# Patient Record
Sex: MALE | Race: WHITE | NOT HISPANIC OR LATINO | Employment: OTHER | URBAN - METROPOLITAN AREA
[De-identification: names, ages, dates, MRNs, and addresses within clinical notes are randomized per-mention and may not be internally consistent; named-entity substitution may affect disease eponyms.]

---

## 2018-04-12 ENCOUNTER — TELEPHONE (OUTPATIENT)
Dept: CARDIOLOGY | Facility: MEDICAL CENTER | Age: 63
End: 2018-04-12

## 2018-04-12 NOTE — TELEPHONE ENCOUNTER
04/13/18 @ 1400 I called the patient back at [phone] and left voicemail re: records if needed for the visit. SAH  -------------------------------------------------------------------------------------------------------------------------------------------------------------------------------------------------------------------------------------------------------------  I called the patient at [phone] or [phone] & left a voicemail regarding:  -To confirm appt with SW for 04/19/18 @ 1400.  -To see if I needed to obtain additional records for the visit.   SAH

## 2018-04-19 ENCOUNTER — OFFICE VISIT (OUTPATIENT)
Dept: CARDIOLOGY | Facility: MEDICAL CENTER | Age: 63
End: 2018-04-19
Payer: COMMERCIAL

## 2018-04-19 VITALS
HEIGHT: 69 IN | BODY MASS INDEX: 25.92 KG/M2 | OXYGEN SATURATION: 97 % | WEIGHT: 175 LBS | HEART RATE: 50 BPM | DIASTOLIC BLOOD PRESSURE: 90 MMHG | SYSTOLIC BLOOD PRESSURE: 190 MMHG

## 2018-04-19 DIAGNOSIS — R07.89 CHEST DISCOMFORT: ICD-10-CM

## 2018-04-19 DIAGNOSIS — I10 ESSENTIAL HYPERTENSION, BENIGN: ICD-10-CM

## 2018-04-19 PROBLEM — H33.20 RETINAL DETACHMENT: Status: ACTIVE | Noted: 2018-04-19

## 2018-04-19 PROBLEM — H26.9 CATARACT: Status: ACTIVE | Noted: 2018-04-19

## 2018-04-19 LAB — EKG IMPRESSION: NORMAL

## 2018-04-19 PROCEDURE — 99214 OFFICE O/P EST MOD 30 MIN: CPT | Performed by: INTERNAL MEDICINE

## 2018-04-19 PROCEDURE — 93000 ELECTROCARDIOGRAM COMPLETE: CPT | Performed by: INTERNAL MEDICINE

## 2018-04-19 RX ORDER — DILTIAZEM HCL 90 MG
90 TABLET ORAL 2 TIMES DAILY
COMMUNITY
End: 2018-04-19

## 2018-04-19 RX ORDER — SILDENAFIL CITRATE 20 MG/1
20 TABLET ORAL PRN
COMMUNITY
End: 2021-11-24

## 2018-04-19 RX ORDER — HYDROCHLOROTHIAZIDE 12.5 MG/1
12.5 TABLET ORAL DAILY
COMMUNITY
End: 2021-11-24

## 2018-04-19 RX ORDER — ALPRAZOLAM 0.5 MG/1
0.5 TABLET ORAL NIGHTLY PRN
COMMUNITY
End: 2021-11-24

## 2018-04-19 RX ORDER — LISINOPRIL 5 MG/1
5 TABLET ORAL DAILY
Qty: 60 TAB | Refills: 11 | Status: SHIPPED | OUTPATIENT
Start: 2018-04-19 | End: 2021-11-24

## 2018-04-19 RX ORDER — AMLODIPINE BESYLATE 2.5 MG/1
2.5 TABLET ORAL DAILY
Qty: 30 TAB | Refills: 11 | Status: SHIPPED | OUTPATIENT
Start: 2018-04-19 | End: 2021-11-24

## 2018-04-19 ASSESSMENT — ENCOUNTER SYMPTOMS
FEVER: 0
PND: 0
LOSS OF CONSCIOUSNESS: 0
DIZZINESS: 0
ORTHOPNEA: 0
BLURRED VISION: 1
INSOMNIA: 0
CHILLS: 0
PALPITATIONS: 0
ABDOMINAL PAIN: 0
MYALGIAS: 0
DEPRESSION: 1
SHORTNESS OF BREATH: 0

## 2018-04-19 NOTE — PROGRESS NOTES
"Chief Complaint   Patient presents with   • Hypertension    • Chest discomfort        Subjective:   Rodrigo Nunez is a 63 y.o. male who presents today referred by his PCP Jorge Rosado D.O., [facility] for a cardiology consultation for evaluation and management of hypertension and chest discomfort.    The patient has a history of hypertension initially diagnosed at the time of the emergency room visit in December, 2015, the patient thinks at [facility].  Blood pressure was 205 systolic.  He also had substernal chest discomfort.  The patient was Overnight and had a stress test that was unremarkable.  He was started on a beta blocker but then \"passed out\" a month later and was started on diltiazem 90 mg twice daily that he is continued.    He had a recent similar episode 3 weeks ago in which he woke up in the middle of night with some chest discomfort and his blood pressures 168/85.  He took a Xanax with some improvement.  Was seen by Dr. Rosado and HCTZ was added.    The patient states that he is under a lot of stress and has some anger issues.   He wonders if his blood pressure can cause these feelings.    Had laser surgery for retinal tear right eye 10/2017.  Right eye vision not improved.  Seen by ophthalmologist today and was diagnosed with a cataract which is recommended to be removed.    Denies a history of heart disease.  History of 40-pack-year smoking quit 10 years ago.  Denies diabetes mellitus or hyperlipidemia.    Social history  .  Works at [employer] 12 hours a day.    Family history negative for heart disease.    Medical history  Right retinal laser surgery 10/2017.  Cataract    History reviewed. No pertinent past medical history.  History reviewed. No pertinent surgical history.  History reviewed. No pertinent family history.  Social History     Social History   • Marital status:      Spouse name: N/A   • Number of children: N/A   • Years of education: " "N/A     Occupational History   • Not on file.     Social History Main Topics   • Smoking status: Former Smoker   • Smokeless tobacco: Never Used   • Alcohol use Not on file   • Drug use: Unknown   • Sexual activity: Not on file     Other Topics Concern   • Not on file     Social History Narrative   • No narrative on file     No Known Allergies  Outpatient Encounter Prescriptions as of 4/19/2018   Medication Sig Dispense Refill   • DILTIAZem (CARDIZEM) 90 MG Tab Take 90 mg by mouth 2 Times a Day.     • hydroCHLOROthiazide (HYDRODIURIL) 12.5 MG tablet Take 12.5 mg by mouth every day.     • Non Formulary Request MATTHEW 40     • [place] Berry 550 MG Cap Take  by mouth.     • MILK THISTLE PO Take 1,260 mg by mouth.     • aspirin 81 MG tablet Take 81 mg by mouth every day.     • ALPRAZolam (XANAX) 0.5 MG Tab Take 0.5 mg by mouth at bedtime as needed for Sleep.     • sildenafil (REVATIO) 20 MG tablet Take 20 mg by mouth as needed.     • promethazine-codeine (PHENERGAN-CODEINE) 6.25-10 MG/5ML Syrup Take 5-10 mL by mouth 3 times a day as needed for Cough (or use qhs). (Patient not taking: Reported on 4/19/2018) 150 mL 0     No facility-administered encounter medications on file as of 4/19/2018.      Review of Systems   Constitutional: Negative for chills and fever.   HENT: Negative for congestion.    Eyes: Positive for blurred vision.   Respiratory: Negative for shortness of breath.    Cardiovascular: Positive for chest pain. Negative for palpitations, orthopnea, leg swelling and PND.   Gastrointestinal: Negative for abdominal pain.   Genitourinary: Negative for dysuria.   Musculoskeletal: Negative for joint pain and myalgias.   Skin: Negative for rash.   Neurological: Negative for dizziness and loss of consciousness.   Psychiatric/Behavioral: Positive for depression. The patient does not have insomnia.         Objective:   BP (!) 190/90   Pulse (!) 50   Ht 1.753 m (5' 9\")   Wt 79.4 kg (175 lb)   SpO2 97%   BMI 25.84 " kg/m²     Physical Exam   Constitutional: He is oriented to person, place, and time. He appears well-developed and well-nourished.   HENT:   Head: Normocephalic and atraumatic.   Eyes: EOM are normal. Pupils are equal, round, and reactive to light. No scleral icterus.   Neck: Neck supple. No JVD present. No thyromegaly present.   Cardiovascular: Normal rate, regular rhythm, normal heart sounds and intact distal pulses.  Exam reveals no gallop and no friction rub.    No murmur heard.  Pulmonary/Chest: Effort normal and breath sounds normal. No respiratory distress. He has no wheezes. He has no rales.   Abdominal: Soft. Bowel sounds are normal. He exhibits no mass. There is no tenderness.   Protuberant.   Musculoskeletal: Normal range of motion. He exhibits no edema or deformity.   Lymphadenopathy:     He has no cervical adenopathy.   Neurological: He is alert and oriented to person, place, and time. No cranial nerve deficit. He exhibits normal muscle tone.   Skin: Skin is warm and dry.   Psychiatric: He has a normal mood and affect. His behavior is normal.     04/19/2018 EKG: Sinus bradycardia, rate 45. Reviewed by myself.    Assessment:     1. Essential hypertension, benign  EKG   2. Chest discomfort         Medical Decision Making:  Today's Assessment / Status / Plan:     1. Hypertension. Uncontrolled.    2. Sinus bradycardia related to diltiazem.    3. Adult situational stress syndrome with anger issues.    Recommendation Discussion  I reviewed with the patient is current situation pertaining to his hypertension.  Start lisinopril 5 mg twice daily.  Instructed to keep daily blood pressure log.  If blood pressure remains elevated start amlodipine 2.5 mg daily.  Follow-up 10 days to weeks.  Will obtain recent blood work apparently done at [facility].  I recommended that he follow-up with Dr. Tate for consideration of professional intervention for his intense anger issue.  Thank you for limited to see this  gentleman in consultation

## 2018-04-19 NOTE — LETTER
"     [facility]   [address] [place] [address]  [place] Armas [address]  Phone: [phone]  Fax: [phone]              Rodrigo Nunez  1955    Encounter Date: 4/19/2018    Jorge Casanova M.D.          PROGRESS NOTE:  Chief Complaint   Patient presents with   • Hypertension    • Chest discomfort        Subjective:   Rodrigo Nunez is a 63 y.o. male who presents today referred by his PCP Jorge Rosado D.O., [facility] for a cardiology consultation for evaluation and management of hypertension and chest discomfort.    The patient has a history of hypertension initially diagnosed at the time of the emergency room visit in December, 2015, the patient thinks at [facility].  Blood pressure was 205 systolic.  He also had substernal chest discomfort.  The patient was Overnight and had a stress test that was unremarkable.  He was started on a beta blocker but then \"passed out\" a month later and was started on diltiazem 90 mg twice daily that he is continued.    He had a recent similar episode 3 weeks ago in which he woke up in the middle of night with some chest discomfort and his blood pressures 168/85.  He took a Xanax with some improvement.  Was seen by Dr. Rosado and HCTZ was added.    The patient states that he is under a lot of stress and has some anger issues.   He wonders if his blood pressure can cause these feelings.    Had laser surgery for retinal tear right eye 10/2017.  Right eye vision not improved.  Seen by ophthalmologist today and was diagnosed with a cataract which is recommended to be removed.    Denies a history of heart disease.  History of 40-pack-year smoking quit 10 years ago.  Denies diabetes mellitus or hyperlipidemia.    Social history  .  Works at [employer] 12 hours a day.    Family history negative for heart disease.    Medical history  Right retinal laser surgery 10/2017.  Cataract    History reviewed. " No pertinent past medical history.  History reviewed. No pertinent surgical history.  History reviewed. No pertinent family history.  Social History     Social History   • Marital status:      Spouse name: N/A   • Number of children: N/A   • Years of education: N/A     Occupational History   • Not on file.     Social History Main Topics   • Smoking status: Former Smoker   • Smokeless tobacco: Never Used   • Alcohol use Not on file   • Drug use: Unknown   • Sexual activity: Not on file     Other Topics Concern   • Not on file     Social History Narrative   • No narrative on file     No Known Allergies  Outpatient Encounter Prescriptions as of 4/19/2018   Medication Sig Dispense Refill   • DILTIAZem (CARDIZEM) 90 MG Tab Take 90 mg by mouth 2 Times a Day.     • hydroCHLOROthiazide (HYDRODIURIL) 12.5 MG tablet Take 12.5 mg by mouth every day.     • Non Formulary Request MATTHEW 40     • [place] Berry 550 MG Cap Take  by mouth.     • MILK THISTLE PO Take 1,260 mg by mouth.     • aspirin 81 MG tablet Take 81 mg by mouth every day.     • ALPRAZolam (XANAX) 0.5 MG Tab Take 0.5 mg by mouth at bedtime as needed for Sleep.     • sildenafil (REVATIO) 20 MG tablet Take 20 mg by mouth as needed.     • promethazine-codeine (PHENERGAN-CODEINE) 6.25-10 MG/5ML Syrup Take 5-10 mL by mouth 3 times a day as needed for Cough (or use qhs). (Patient not taking: Reported on 4/19/2018) 150 mL 0     No facility-administered encounter medications on file as of 4/19/2018.      Review of Systems   Constitutional: Negative for chills and fever.   HENT: Negative for congestion.    Eyes: Positive for blurred vision.   Respiratory: Negative for shortness of breath.    Cardiovascular: Positive for chest pain. Negative for palpitations, orthopnea, leg swelling and PND.   Gastrointestinal: Negative for abdominal pain.   Genitourinary: Negative for dysuria.   Musculoskeletal: Negative for joint pain and myalgias.   Skin: Negative for rash.    "  Neurological: Negative for dizziness and loss of consciousness.   Psychiatric/Behavioral: Positive for depression. The patient does not have insomnia.         Objective:   BP (!) 190/90   Pulse (!) 50   Ht 1.753 m (5' 9\")   Wt 79.4 kg (175 lb)   SpO2 97%   BMI 25.84 kg/m²      Physical Exam   Constitutional: He is oriented to person, place, and time. He appears well-developed and well-nourished.   HENT:   Head: Normocephalic and atraumatic.   Eyes: EOM are normal. Pupils are equal, round, and reactive to light. No scleral icterus.   Neck: Neck supple. No JVD present. No thyromegaly present.   Cardiovascular: Normal rate, regular rhythm, normal heart sounds and intact distal pulses.  Exam reveals no gallop and no friction rub.    No murmur heard.  Pulmonary/Chest: Effort normal and breath sounds normal. No respiratory distress. He has no wheezes. He has no rales.   Abdominal: Soft. Bowel sounds are normal. He exhibits no mass. There is no tenderness.   Protuberant.   Musculoskeletal: Normal range of motion. He exhibits no edema or deformity.   Lymphadenopathy:     He has no cervical adenopathy.   Neurological: He is alert and oriented to person, place, and time. No cranial nerve deficit. He exhibits normal muscle tone.   Skin: Skin is warm and dry.   Psychiatric: He has a normal mood and affect. His behavior is normal.     04/19/2018 EKG: Sinus bradycardia, rate 45. Reviewed by myself.    Assessment:     1. Essential hypertension, benign  EKG   2. Chest discomfort         Medical Decision Making:  Today's Assessment / Status / Plan:     1. Hypertension. Uncontrolled.    2. Sinus bradycardia related to diltiazem.    3. Adult situational stress syndrome with anger issues.    Recommendation Discussion  I reviewed with the patient is current situation pertaining to his hypertension.  Start lisinopril 5 mg twice daily.  Instructed to keep daily blood pressure log.  If blood pressure remains elevated start " amlodipine 2.5 mg daily.  Follow-up 10 days to weeks.  Will obtain recent blood work apparently done at [facility].  I recommended that he follow-up with Dr. Tate for consideration of professional intervention for his intense anger issue.  Thank you for limited to see this gentleman in consultation          Jorge Rosado D.O.  [address]  [facility] [address]  VIA Facsimile: [phone]

## 2018-05-14 ENCOUNTER — TELEPHONE (OUTPATIENT)
Dept: CARDIOLOGY | Facility: MEDICAL CENTER | Age: 63
End: 2018-05-14

## 2018-05-14 NOTE — TELEPHONE ENCOUNTER
05/23/18 No cardiac records at [facility]. Records from [facility] in media.  05/14/18 I sent an WILLY to [facility] and [facility] to obtain hospitalization records from 2015.SAH  -------------------------------------------------------------------------------------------------------------------------------------------------------------------------------------------------------------------------------------------------------------  ----- Message from Deb Miramontes, Med Ass't sent at 4/19/2018  3:54 PM PDT -----  Regarding: FW: medical records      ----- Message -----  From: Jorge Casanova M.D.  Sent: 4/19/2018   2:52 PM  To: [facility] Health Information Management  Subject: medical records                                  Please check for medical records for hospitalization in 2015 at [facility] in [facility]  Please include all diagnostic tests, EKG tracings H&P, consult and discharge summary

## 2018-05-15 ENCOUNTER — OFFICE VISIT (OUTPATIENT)
Dept: URGENT CARE | Facility: CLINIC | Age: 63
End: 2018-05-15
Payer: COMMERCIAL

## 2018-05-15 VITALS
WEIGHT: 178.6 LBS | OXYGEN SATURATION: 98 % | BODY MASS INDEX: 26.45 KG/M2 | HEIGHT: 69 IN | TEMPERATURE: 97.3 F | HEART RATE: 46 BPM | SYSTOLIC BLOOD PRESSURE: 148 MMHG | RESPIRATION RATE: 16 BRPM | DIASTOLIC BLOOD PRESSURE: 76 MMHG

## 2018-05-15 DIAGNOSIS — R00.1 BRADYCARDIA: ICD-10-CM

## 2018-05-15 DIAGNOSIS — I10 ESSENTIAL HYPERTENSION: ICD-10-CM

## 2018-05-15 DIAGNOSIS — R42 DIZZINESS: ICD-10-CM

## 2018-05-15 PROCEDURE — 99214 OFFICE O/P EST MOD 30 MIN: CPT | Performed by: NURSE PRACTITIONER

## 2018-05-15 RX ORDER — DILTIAZEM HCL 90 MG
90 TABLET ORAL 4 TIMES DAILY
COMMUNITY
End: 2021-11-24

## 2018-05-15 ASSESSMENT — ENCOUNTER SYMPTOMS
SHORTNESS OF BREATH: 0
DIAPHORESIS: 0
LOSS OF CONSCIOUSNESS: 0
TREMORS: 0
SPEECH CHANGE: 0
HEADACHES: 0
BLURRED VISION: 0
PALPITATIONS: 0
NECK PAIN: 0
CHILLS: 0
ORTHOPNEA: 0
TINGLING: 0
FEVER: 0
FOCAL WEAKNESS: 0
WEAKNESS: 0
DIZZINESS: 0
COUGH: 0

## 2018-05-15 NOTE — PROGRESS NOTES
"Subjective:      Rodrigo Nunez is a 63 y.o. male who presents with Dizziness (dizziness/ hypertension today )            Patient comes in today reporting a resolved episode of dizziness earlier this morning.  Dizziness came on after strenuous physical activity.  He noted feeling \"wobbly legs\" and noted clammy sweating.  Sensation lasted less than 2 minutes.  It resolved completely when he sat down and drank a glass of water.  He denies any sensation of chest pain, palpitations, or shortness of breath.  He did eat a light breakfast about 4 hours prior to the episode.  He reports he only drank enough water to swallow his AM medications this morning, and has not had any other fluid today.  He does have a history of similar symptoms.  No actual syncope.  He has known hypertension and sinus bradycardia.  He was recently prescribed amlodipine and lisinopril but discontinued due to dizziness.  He went back to his old Rx of diltiazem 90 mg and HCTZ 12.5 mg.  He also takes ASA daily.  He takes [place] berry and mild thistle.  He has alprazolam for prn use, but has not used recently.  He is scheduled to establish with a different cardiologist tomorrow.  He reports feeling 100% improved since earlier episode today.           Review of Systems   Constitutional: Negative for chills, diaphoresis, fever and malaise/fatigue.   Eyes: Negative for blurred vision.   Respiratory: Negative for cough and shortness of breath.    Cardiovascular: Negative for chest pain, palpitations, orthopnea and leg swelling.   Musculoskeletal: Negative for neck pain.   Neurological: Negative for dizziness, tingling, tremors, speech change, focal weakness, loss of consciousness, weakness and headaches.     Medications, Allergies, and current problem list reviewed today in Epic     Objective:     /76   Pulse (!) 46   Temp 36.3 °C (97.3 °F)   Resp 16   Ht 1.753 m (5' 9\")   Wt 81 kg (178 lb 9.6 oz)   SpO2 98%   BMI 26.37 kg/m²  "     Physical Exam   Constitutional: He is oriented to person, place, and time. He appears well-developed and well-nourished. No distress.   Eyes: Conjunctivae and EOM are normal. Pupils are equal, round, and reactive to light. Right eye exhibits no discharge. Left eye exhibits no discharge. No scleral icterus.   Neck: Normal range of motion. Neck supple. No JVD present. No tracheal deviation present. No thyromegaly present.   Cardiovascular: Regular rhythm and normal heart sounds.  Exam reveals no gallop and no friction rub.    No murmur heard.  No provoked dizziness on rising from supine to standing.   Bradycardia noted, consistent with past readings.   Pulmonary/Chest: Effort normal and breath sounds normal. No stridor. No respiratory distress. He has no wheezes. He has no rales. He exhibits no tenderness.   Musculoskeletal: He exhibits no edema.   Lymphadenopathy:     He has no cervical adenopathy.   Neurological: He is alert and oriented to person, place, and time. No cranial nerve deficit.   Skin: Skin is warm and dry. He is not diaphoretic. No erythema. No pallor.   Psychiatric: He has a normal mood and affect. His behavior is normal.   Vitals reviewed.              Assessment/Plan:     1. Dizziness     2. Essential hypertension     3. Bradycardia       Discussed exam findings with patient.  Follow up with cardiology tomorrow as scheduled.  Maintain adequate po hydration.  Do not drive or operate heavy machinery if dizzy.  ED precautions reviewed.  Patient verbalized understanding of and agreed with plan of care.

## 2018-05-16 ENCOUNTER — SUPERVISING PHYSICIAN REVIEW (OUTPATIENT)
Dept: URGENT CARE | Facility: PHYSICIAN GROUP | Age: 63
End: 2018-05-16

## 2018-12-16 ENCOUNTER — HOSPITAL ENCOUNTER (EMERGENCY)
Dept: HOSPITAL 8 - ED | Age: 63
Discharge: HOME | End: 2018-12-16
Payer: COMMERCIAL

## 2018-12-16 VITALS — BODY MASS INDEX: 26.64 KG/M2 | WEIGHT: 179.9 LBS | HEIGHT: 69 IN

## 2018-12-16 VITALS — DIASTOLIC BLOOD PRESSURE: 86 MMHG | SYSTOLIC BLOOD PRESSURE: 145 MMHG

## 2018-12-16 DIAGNOSIS — I10: ICD-10-CM

## 2018-12-16 DIAGNOSIS — S00.81XA: Primary | ICD-10-CM
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
## 2018-12-16 DIAGNOSIS — R55: ICD-10-CM
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
## 2018-12-16 LAB
ALBUMIN SERPL-MCNC: 3.8 G/DL (ref 3.4–5)
ALP SERPL-CCNC: 107 U/L (ref 45–117)
ALT SERPL-CCNC: 119 U/L (ref 12–78)
ANION GAP SERPL CALC-SCNC: 5 MMOL/L (ref 5–15)
BASOPHILS # BLD AUTO: 0.03 X10^3/UL (ref 0–0.1)
BASOPHILS NFR BLD AUTO: 0 % (ref 0–1)
BILIRUB SERPL-MCNC: 0.8 MG/DL (ref 0.2–1)
CALCIUM SERPL-MCNC: 8.7 MG/DL (ref 8.5–10.1)
CHLORIDE SERPL-SCNC: 108 MMOL/L (ref 98–107)
CREAT SERPL-MCNC: 1.02 MG/DL (ref 0.7–1.3)
EOSINOPHIL # BLD AUTO: 0.03 X10^3/UL (ref 0–0.4)
EOSINOPHIL NFR BLD AUTO: 0 % (ref 1–7)
ERYTHROCYTE [DISTWIDTH] IN BLOOD BY AUTOMATED COUNT: 11.9 % (ref 9.4–14.8)
LYMPHOCYTES # BLD AUTO: 1.6 X10^3/UL (ref 1–3.4)
LYMPHOCYTES NFR BLD AUTO: 18 % (ref 22–44)
MCH RBC QN AUTO: 32 PG (ref 27.5–34.5)
MCHC RBC AUTO-ENTMCNC: 35.3 G/DL (ref 33.2–36.2)
MCV RBC AUTO: 90.5 FL (ref 81–97)
MD: NO
MONOCYTES # BLD AUTO: 0.6 X10^3/UL (ref 0.2–0.8)
MONOCYTES NFR BLD AUTO: 7 % (ref 2–9)
NEUTROPHILS # BLD AUTO: 6.82 X10^3/UL (ref 1.8–6.8)
NEUTROPHILS NFR BLD AUTO: 75 % (ref 42–75)
PLATELET # BLD AUTO: 136 X10^3/UL (ref 130–400)
PMV BLD AUTO: 7.9 FL (ref 7.4–10.4)
PROT SERPL-MCNC: 7.4 G/DL (ref 6.4–8.2)
RBC # BLD AUTO: 5.05 X10^6/UL (ref 4.38–5.82)
TROPONIN I SERPL-MCNC: < 0.015 NG/ML (ref 0–0.04)

## 2018-12-16 PROCEDURE — 99284 EMERGENCY DEPT VISIT MOD MDM: CPT

## 2018-12-16 PROCEDURE — 80053 COMPREHEN METABOLIC PANEL: CPT

## 2018-12-16 PROCEDURE — 84484 ASSAY OF TROPONIN QUANT: CPT

## 2018-12-16 PROCEDURE — 71045 X-RAY EXAM CHEST 1 VIEW: CPT

## 2018-12-16 PROCEDURE — 70450 CT HEAD/BRAIN W/O DYE: CPT

## 2018-12-16 PROCEDURE — 93005 ELECTROCARDIOGRAM TRACING: CPT

## 2018-12-16 PROCEDURE — 85025 COMPLETE CBC W/AUTO DIFF WBC: CPT

## 2018-12-16 PROCEDURE — 36415 COLL VENOUS BLD VENIPUNCTURE: CPT

## 2021-10-05 RX ORDER — ALLOPURINOL 100 MG/1
100 TABLET ORAL 4 TIMES DAILY
COMMUNITY
End: 2021-10-05 | Stop reason: SDUPTHER

## 2021-10-05 NOTE — TELEPHONE ENCOUNTER
Last seen: 09/09/21 by Dr. Golden  Next appt: None    Was the patient seen in the last year in this department? Yes   Does patient have an active prescription for medications requested? No   Received Request Via: Pharmacy

## 2021-10-07 RX ORDER — ALLOPURINOL 100 MG/1
100 TABLET ORAL 4 TIMES DAILY
Qty: 120 TABLET | Refills: 0 | Status: SHIPPED | OUTPATIENT
Start: 2021-10-07 | End: 2021-11-16

## 2021-11-16 RX ORDER — ALLOPURINOL 100 MG/1
100 TABLET ORAL 4 TIMES DAILY
Qty: 120 TABLET | Refills: 0 | Status: SHIPPED | OUTPATIENT
Start: 2021-11-16 | End: 2022-01-03

## 2021-11-16 NOTE — TELEPHONE ENCOUNTER
Last seen: 09/09/21 by Dr. tomas  Next appt: None    Was the patient seen in the last year in this department? Yes   Does patient have an active prescription for medications requested? No   Received Request Via: Pharmacy

## 2021-11-24 ENCOUNTER — OFFICE VISIT (OUTPATIENT)
Dept: INTERNAL MEDICINE | Facility: OTHER | Age: 66
End: 2021-11-24
Payer: COMMERCIAL

## 2021-11-24 VITALS
WEIGHT: 183.6 LBS | DIASTOLIC BLOOD PRESSURE: 78 MMHG | SYSTOLIC BLOOD PRESSURE: 142 MMHG | OXYGEN SATURATION: 98 % | HEART RATE: 56 BPM | TEMPERATURE: 97.5 F | HEIGHT: 69 IN | BODY MASS INDEX: 27.19 KG/M2

## 2021-11-24 DIAGNOSIS — M10.072 IDIOPATHIC GOUT, LEFT ANKLE AND FOOT: ICD-10-CM

## 2021-11-24 DIAGNOSIS — K12.0 CANKER SORES ORAL: ICD-10-CM

## 2021-11-24 DIAGNOSIS — R07.89 CHEST DISCOMFORT: ICD-10-CM

## 2021-11-24 DIAGNOSIS — Z12.2 ENCOUNTER FOR SCREENING FOR MALIGNANT NEOPLASM OF LUNG IN FORMER SMOKER WHO QUIT IN PAST 15 YEARS WITH 30 PACK YEAR HISTORY OR GREATER: ICD-10-CM

## 2021-11-24 DIAGNOSIS — I10 ESSENTIAL HYPERTENSION, BENIGN: ICD-10-CM

## 2021-11-24 DIAGNOSIS — Z13.29 SCREENING FOR ENDOCRINE, METABOLIC AND IMMUNITY DISORDER: ICD-10-CM

## 2021-11-24 DIAGNOSIS — Z13.228 SCREENING FOR ENDOCRINE, METABOLIC AND IMMUNITY DISORDER: ICD-10-CM

## 2021-11-24 DIAGNOSIS — Z87.891 ENCOUNTER FOR SCREENING FOR MALIGNANT NEOPLASM OF LUNG IN FORMER SMOKER WHO QUIT IN PAST 15 YEARS WITH 30 PACK YEAR HISTORY OR GREATER: ICD-10-CM

## 2021-11-24 DIAGNOSIS — Z13.0 SCREENING FOR ENDOCRINE, METABOLIC AND IMMUNITY DISORDER: ICD-10-CM

## 2021-11-24 PROBLEM — B19.20 VIRAL HEPATITIS C: Status: ACTIVE | Noted: 2021-03-08

## 2021-11-24 PROBLEM — D69.6 THROMBOCYTOPENIA (HCC): Status: ACTIVE | Noted: 2021-09-09

## 2021-11-24 PROBLEM — M25.512 PAIN OF BOTH SHOULDER JOINTS: Status: ACTIVE | Noted: 2021-03-08

## 2021-11-24 PROBLEM — M25.511 PAIN OF BOTH SHOULDER JOINTS: Status: ACTIVE | Noted: 2021-03-08

## 2021-11-24 PROCEDURE — 99214 OFFICE O/P EST MOD 30 MIN: CPT | Mod: GC | Performed by: STUDENT IN AN ORGANIZED HEALTH CARE EDUCATION/TRAINING PROGRAM

## 2021-11-24 RX ORDER — ASPIRIN 81 MG/1
TABLET ORAL
COMMUNITY
Start: 2021-06-21

## 2021-11-24 RX ORDER — IRBESARTAN 150 MG/1
300 TABLET ORAL NIGHTLY
COMMUNITY
End: 2022-03-03 | Stop reason: SDUPTHER

## 2021-11-24 RX ORDER — ASPIRIN 81 MG/1
TABLET ORAL
COMMUNITY
Start: 2021-06-21 | End: 2021-11-24

## 2021-11-24 ASSESSMENT — ENCOUNTER SYMPTOMS
DIZZINESS: 0
SHORTNESS OF BREATH: 0
SORE THROAT: 1
LOSS OF CONSCIOUSNESS: 0

## 2021-11-24 ASSESSMENT — PATIENT HEALTH QUESTIONNAIRE - PHQ9: CLINICAL INTERPRETATION OF PHQ2 SCORE: 0

## 2021-11-24 NOTE — PROGRESS NOTES
11/24/2021  7:16 AM    Chief Complaint   Patient presents with   • Pharyngitis     couple weeks   • Chest Pressure     mainly at night       History of Present Illness:  66 y.o. male established patient of Dr. Golden Who presents today for an acute visit.     #Sore throat, oral sores: Present for about 2 weeks has some pain but is mostly centered around the area of the sores no cough chest congestion fever chills.  Tenderness when he touches lesions with his tongue.  #Chest discomfort: Feels like a slight tightening around the breastbone comes on for seconds to minutes at a time but more at night when he lays down. He jogs 3 times a week and uses the rowing machine and never has any issues with exertion, in fact he feels better after exercising, no shortness of breath or limitations due to breathing or chest pain.  Occasional heartburn is been making more [place] food.  He had EKG in 2018 which was reviewed and was normal, he has bradycardia in the 40s to 50s at baseline.  Concerned about his cardiac risk due to his friends having bypass surgery.  He is a former smoker.  #Hypertension: Irbesartan 300 daily, was on several other medications in the past used to follow cardiologist at [place] for this however he moved to [facility] and the patient did not wish to reestablish there.  States his blood pressure improves when his heart rate increases    Patient Active Problem List    Diagnosis Date Noted   • Canker sores oral 11/24/2021   • Essential hypertension, benign 04/19/2018   • Retinal detachment 04/19/2018   • Cataract 04/19/2018   • Chest discomfort 04/19/2018       Allergies:Patient has no known allergies.    Current Outpatient Medications   Medication Sig Dispense Refill   • aspirin 81 MG EC tablet ASPIRIN 81 MG TABLET     • irbesartan (AVAPRO) 150 MG Tab Take 300 mg by mouth every evening.     • allopurinol (ZYLOPRIM) 100 MG Tab TAKE 1 TABLET BY MOUTH 4 TIMES A DAY. 120 Tablet 0     No current  "facility-administered medications for this visit.       Social History     Tobacco Use   • Smoking status: Former Smoker     Packs/day: 0.50     Types: Cigarettes     Start date: 1970     Quit date: 5/15/2012     Years since quittin.5   • Smokeless tobacco: Never Used   Substance Use Topics   • Alcohol use: Not on file   • Drug use: Not on file       History reviewed. No pertinent family history.    Review of Systems   HENT: Positive for sore throat.         Oral sores   Respiratory: Negative for shortness of breath.    Cardiovascular: Negative for chest pain and leg swelling.   Neurological: Negative for dizziness and loss of consciousness.     See HPI.    /78 (BP Location: Left arm, Patient Position: Sitting, BP Cuff Size: Adult)   Pulse (!) 56   Temp 36.4 °C (97.5 °F) (Temporal)   Ht 1.753 m (5' 9\")   Wt 83.3 kg (183 lb 9.6 oz)   SpO2 98%  Body mass index is 27.11 kg/m².  Physical Exam  Vitals and nursing note reviewed.   Constitutional:       Appearance: Normal appearance. He is not toxic-appearing.   HENT:      Head: Normocephalic and atraumatic.      Mouth/Throat:      Mouth: Mucous membranes are moist.      Pharynx: Oropharyngeal exudate and posterior oropharyngeal erythema present.      Comments: Few small aphthous ulcer appearing at one o clock, healing  Cardiovascular:      Rate and Rhythm: Normal rate and regular rhythm.      Pulses: Normal pulses.      Heart sounds: Normal heart sounds. No murmur heard.  No friction rub. No gallop.       Comments: No chest tenderness  Pulmonary:      Effort: Pulmonary effort is normal. No respiratory distress.      Breath sounds: Normal breath sounds. No wheezing or rales.   Abdominal:      General: There is no distension.      Palpations: Abdomen is soft.      Tenderness: There is no abdominal tenderness. There is no guarding or rebound.   Musculoskeletal:      Cervical back: Normal range of motion and neck supple. No rigidity or tenderness.      " Right lower leg: No edema.      Left lower leg: No edema.   Lymphadenopathy:      Cervical: No cervical adenopathy.   Skin:     General: Skin is warm and dry.      Capillary Refill: Capillary refill takes less than 2 seconds.      Findings: No lesion or rash.   Neurological:      General: No focal deficit present.      Mental Status: He is alert and oriented to person, place, and time.   Psychiatric:         Mood and Affect: Mood normal.         Behavior: Behavior normal.          Assessment/Plan:     1. Canker sores oral  Likely self-limiting viral process herpes virus versus coxsackievirus  Recommended Orajel or mouthwash menthol lidocaine for symptomatic relief    2. Essential hypertension, benign  Continue irbesartan  Obtain labs before next visit  - Basic Metabolic Panel (BMP); Future  - Lipid Profile (Lipid Panel); Future  - Microalbumin, UR Random    3. Chest discomfort  After extensive history taking discussion he has no red flag symptoms for acute cardiac syndrome or cardiac cause of chest pain.  Recommend trial of  Recommended trial of calcium carbonate or H2 blockers for possible heartburn  Reassured patient that we will screen for metabolic disorders and obtain ASCVD risk with consideration of coronary calcium CT if borderline    4. Screening for endocrine, metabolic and immunity disorder  Screening as above  - HEMOGLOBIN A1C; Future    5. Encounter for screening for malignant neoplasm of lung in former smoker who quit in past 15 years with 30 pack year history or greater  Order lung cancer screen for smoking history  - CT-LUNG CANCER-SCREENING; Future  - REFERRAL TO LUNG CANCER SCREENING PROGRAM; Future    6. Gout  May titrate down allopurinol as he has not had a flareup in many years from 400 mg to 200 mg and recheck uric acid in several weeks.    Other orders  - aspirin 81 MG EC tablet; ASPIRIN 81 MG TABLET  - irbesartan (AVAPRO) 150 MG Tab; Take 300 mg by mouth every evening.      The major risks of  all medications prescribed were explained and any questions answered.    All imaging results and lab results and consult notes are reviewed at this visit.  Followup: Return in about 10 weeks (around 2/2/2022).

## 2021-11-24 NOTE — PATIENT INSTRUCTIONS
Use over the counter mouthwash with oral lidocaine or menthol for pain associated with sores    Please get Fasting lab tests done one week before next visit. Avoid eating 8-10 hours before, sips of water are ok.

## 2021-12-06 ENCOUNTER — TELEPHONE (OUTPATIENT)
Dept: HEMATOLOGY ONCOLOGY | Facility: MEDICAL CENTER | Age: 66
End: 2021-12-06

## 2021-12-06 NOTE — TELEPHONE ENCOUNTER
Received referral to lung cancer screening program.  Chart review to assess for lung cancer screening program eligibility.   1. Age 55-77 yrs of age? Yes 66 y.o.  2. 30 pack year hx of smoking, or greater? No 0.5 [id number]= [id number] hx  3. Current smoker or if quit, has pt quit within last 15 yrs?Yes  Quit 2012  4. Any signs or symptoms of lung cancer? None noted  5. Previous history of lung cancer? None noted  6. Chest CT within past 12 mos.? None noted  Patient does not meet eligibility criteria. LCSP scheduling notified to schedule the shared decision making visit.      Patient DOES NOT meet criteria for LCSP due to their smoking history, the patient does not n=meet the 30 pack year minimum required.

## 2022-01-03 NOTE — TELEPHONE ENCOUNTER
Last seen: 11/24/21 by Dr. Humphrey  Next appt: 01/25/22 with Dr. Golden    Was the patient seen in the last year in this department? Yes   Does patient have an active prescription for medications requested? No   Received Request Via: Pharmacy

## 2022-01-06 RX ORDER — ALLOPURINOL 100 MG/1
100 TABLET ORAL 4 TIMES DAILY
Qty: 120 TABLET | Refills: 0 | Status: SHIPPED | OUTPATIENT
Start: 2022-01-06 | End: 2022-01-25 | Stop reason: SDUPTHER

## 2022-01-25 ENCOUNTER — OFFICE VISIT (OUTPATIENT)
Dept: INTERNAL MEDICINE | Facility: OTHER | Age: 67
End: 2022-01-25
Payer: COMMERCIAL

## 2022-01-25 VITALS
HEART RATE: 55 BPM | BODY MASS INDEX: 27.73 KG/M2 | HEIGHT: 69 IN | TEMPERATURE: 98.8 F | DIASTOLIC BLOOD PRESSURE: 70 MMHG | SYSTOLIC BLOOD PRESSURE: 135 MMHG | OXYGEN SATURATION: 95 % | WEIGHT: 187.2 LBS

## 2022-01-25 DIAGNOSIS — E78.5 DYSLIPIDEMIA: ICD-10-CM

## 2022-01-25 DIAGNOSIS — I10 ESSENTIAL HYPERTENSION, BENIGN: ICD-10-CM

## 2022-01-25 DIAGNOSIS — M10.072 IDIOPATHIC GOUT, LEFT ANKLE AND FOOT: ICD-10-CM

## 2022-01-25 PROCEDURE — 99213 OFFICE O/P EST LOW 20 MIN: CPT | Mod: GE | Performed by: STUDENT IN AN ORGANIZED HEALTH CARE EDUCATION/TRAINING PROGRAM

## 2022-01-25 RX ORDER — ATORVASTATIN CALCIUM 20 MG/1
20 TABLET, FILM COATED ORAL NIGHTLY
Qty: 60 TABLET | Refills: 4 | Status: SHIPPED | OUTPATIENT
Start: 2022-01-25 | End: 2022-10-10 | Stop reason: SDUPTHER

## 2022-01-25 RX ORDER — ALLOPURINOL 100 MG/1
100 TABLET ORAL 2 TIMES DAILY
COMMUNITY
Start: 2022-01-25 | End: 2022-03-02

## 2022-01-25 ASSESSMENT — PATIENT HEALTH QUESTIONNAIRE - PHQ9: CLINICAL INTERPRETATION OF PHQ2 SCORE: 0

## 2022-01-25 NOTE — PROGRESS NOTES
Established Patient    Rodrigo presents today with the following:    CC: follow up of chronic problems.    HPI:   This is 67 yo male with a history of gout, HTN, hepatitis C, retinal detachment,cataracts, presenting to our clinic for follow up visit.    Lab review:    HDL 30        BP at home range in 130s to 140s. /70 on repeat measurement in clinic today.    No gout attack since last year. Patient states that he is good about avoiding red meats and alcohol.     Patient is agreeable to starting Lipitor given his ASCVD risk. Explained the risks and benefits of starting Lipitor. Explained the side effect of myopathy.    Patient states that his sore throat and mouth sores have improved since starting mouth wash.        Patient went through divorce last year. No smoking, alcohol, drug. Former smoker      Health maintenance:   Colonoscopy due in 2023.   CT lung cancer screening due and pending.      Patient Active Problem List    Diagnosis Date Noted   • Dyslipidemia 01/25/2022   • Canker sores oral 11/24/2021   • Thrombocytopenia (HCC) 09/09/2021   • Idiopathic gout, left ankle and foot 03/31/2021   • Pain of both shoulder joints 03/08/2021   • Viral hepatitis C 03/08/2021   • Essential hypertension, benign 04/19/2018   • Retinal detachment 04/19/2018   • Cataract 04/19/2018   • Chest discomfort 04/19/2018       Current Outpatient Medications   Medication Sig Dispense Refill   • allopurinol (ZYLOPRIM) 100 MG Tab Take 1 Tablet by mouth 2 times a day.     • atorvastatin (LIPITOR) 20 MG Tab Take 1 Tablet by mouth every evening. 60 Tablet 4   • aspirin 81 MG EC tablet ASPIRIN 81 MG TABLET     • irbesartan (AVAPRO) 150 MG Tab Take 300 mg by mouth every evening.       No current facility-administered medications for this visit.       ROS: As per HPI. Additional pertinent systems as noted below.  Constitutional: Negative for chills and fever.   HENT: Negative for ear pain and sore throat.    Eyes:  "Negative for discharge and redness.   Respiratory: Negative for cough, hemoptysis, wheezing and stridor.    Cardiovascular: Negative for chest pain, palpitations and leg swelling.   Gastrointestinal: Negative for abdominal pain, constipation, diarrhea, heartburn, nausea and vomiting.   Skin: Negative for itching and rash.   Neurological: Negative for dizziness, seizures, loss of consciousness and headaches.   Endo/Heme/Allergies: Negative for polydipsia. Does not bruise/bleed easily.   Psychiatric/Behavioral: Negative for substance abuse and suicidal ideas.       /70 (BP Location: Right arm, Patient Position: Sitting, BP Cuff Size: Adult)   Pulse (!) 55   Temp 37.1 °C (98.8 °F) (Temporal)   Ht 1.753 m (5' 9.02\")   Wt 84.9 kg (187 lb 3.2 oz)   SpO2 95%   BMI 27.63 kg/m²     Physical Exam   Constitutional:  oriented to person, place, and time. No distress.   Eyes: Extraocular muscles are intact. Pupils are equal, round, and reactive to light. No scleral icterus.  Neck: Neck supple. No thyromegaly present.   Cardiovascular: Normal rate, regular rhythm and normal heart sounds.  Exam reveals no gallop and no friction rub.  No murmur heard.  Pulmonary/Chest: Breath sounds normal. Breathing is nonlabored.   Musculoskeletal:   no edema.   Lymphadenopathy: no cervical adenopathy  Neurological: alert and oriented to person, place, and time.   Skin: No cyanosis. Nails show no clubbing.      Note: I have reviewed all pertinent labs and diagnostic tests associated with this visit with specific comments listed under the assessment and plan below    Assessment and Plan  1. Essential hypertension, benign   -BP at home range in 130s to 140s. /70 on repeat measurement in clinic today.    2. Idiopathic gout, left ankle and foot  No gout attack since last year. Patient is good about avoiding red meats and alcohol.  -last uric acid 2.4 in august 2021.       Plan:  switch to allopurinol 200mg daily from 400mg " daily  check uric acid in 3 months.  - URIC ACID; Future    3. Dyslipidemia  -ASCVD score 18.8% moderate to high intensity statin recommended.    Plan:  Take lipitor 20 mg daily       Followup: Return in about 10 weeks (around 4/5/2022).      Signed by: Michael Golden M.D.

## 2022-01-26 NOTE — ASSESSMENT & PLAN NOTE
No gout attack since last year. Patient is good about avoiding red meats and alcohol.  -last uric acid 2.4 in august 2021.       Plan:  switch to allopurinol 200mg daily from 400mg daily  check uric acid in 3 months.

## 2022-01-26 NOTE — ASSESSMENT & PLAN NOTE
-ASCVD score 18.8% moderate to high intensity statin recommended.    Plan:  Take lipitor 20 mg daily

## 2022-03-02 RX ORDER — ALLOPURINOL 100 MG/1
100 TABLET ORAL 2 TIMES DAILY
Qty: 180 TABLET | Refills: 1 | Status: SHIPPED | OUTPATIENT
Start: 2022-03-02 | End: 2022-05-02 | Stop reason: SDUPTHER

## 2022-03-02 RX ORDER — ALLOPURINOL 100 MG/1
200 TABLET ORAL DAILY
Qty: 180 TABLET | Refills: 1 | Status: SHIPPED | OUTPATIENT
Start: 2022-03-02 | End: 2022-03-02 | Stop reason: SDUPTHER

## 2022-03-02 NOTE — TELEPHONE ENCOUNTER
Patient Name: Manjula Escalera  MR#: [id number]  : 2016        [place] History & Physical    Gender: male BW: 8 lb 2 oz (3685 g)   Age: 15 hours OB:    Gestational Age at Birth: Gestational Age: 39w1d Pediatrician:  IR[facility]     Maternal Information:     Mother's Name: Tamanna Escalera    Age: 37 y.o.         Outside Maternal Prenatal Labs -- transcribed from office records:   External Prenatal Results     Pregnancy Outside Results - Transcribed From Office Records - See Scanned Records For Details     Test Value Date Time    Hgb 13.5 g/dL 18 1751    Hct 40.1 % 18 1751    ABO A  18 1751    Rh Positive  18 1751    Antibody Screen Negative  18 175    Glucose Fasting GTT       Glucose Tolerance Test 1 hour 59  18     Glucose Tolerance Test 3 hour       Gonorrhea (discrete)       Chlamydia (discrete)       RPR Non-Reactive  18     VDRL       Syphilis Antibody       Rubella Immune  18     HBsAg Negative  18     Herpes Simplex Virus PCR       Herpes Simplex VIrus Culture       HIV Non-Reactive  18     Hep C RNA Quant PCR       Hep C Antibody       AFP       Group B Strep No Group B Streptococcus Isolated from Broth Culture  10/12/18 1739    GBS Susceptibility to Clindamycin       GBS Susceptibility to Erythromycin       Fetal Fibronectin       Genetic Testing, Maternal Blood             Drug Screening     Test Value Date Time    Urine Drug Screen       Amphetamine Screen Negative  18 1847    Barbiturate Screen Negative  18 184    Benzodiazepine Screen Negative  18 1847    Methadone Screen Negative  18 184    Phencyclidine Screen Negative  18 1847    Opiates Screen Negative  18 1847    THC Screen Negative  18 1847    Cocaine Screen       Propoxyphene Screen Negative  18 184    Buprenorphine Screen Negative  18 184    Methamphetamine Screen       Oxycodone Screen Negative  18 184    Tricyclic  Last seen: 01/25/2022 by Dr. Golden  Next appt: 04/05/2022 with Dr. Golden    Was the patient seen in the last year in this department? Yes   Does patient have an active prescription for medications requested? No   Received Request Via: Pharmacy   "Antidepressants Screen Negative  18 1847                  Information for the patient's mother:  JwTamanna vegas [[id number]]     Patient Active Problem List   Diagnosis   • Advanced maternal age, primigravida, antepartum   • Uterine fibroid complicating  care, baby not yet delivered   • Cervical lesion   • AYLEEN I (cervical intraepithelial neoplasia I)   • Low grade squamous intraepithelial lesion on cytologic smear of cervix (LGSIL)   • Pregnancy   •  (spontaneous vaginal delivery)        Mother's Past Medical and Social History:      Maternal /Para:    Maternal PMH:    Past Medical History:   Diagnosis Date   • Abnormal Pap smear of cervix 2018   • Childhood asthma    • Frequent headaches     \"no formal diagnosis\"    • Uterine fibroids affecting pregnancy 2018     Maternal Social History:    Social History     Social History   • Marital status:      Spouse name: N/A   • Number of children: N/A   • Years of education: N/A     Occupational History   • Not on file.     Social History Main Topics   • Smoking status: Never Smoker   • Smokeless tobacco: Never Used   • Alcohol use Yes      Comment: 1-2/week when not pregnant   • Drug use: No   • Sexual activity: Defer     Other Topics Concern   • Not on file     Social History Narrative   • No narrative on file       Mother's Current Medications     Information for the patient's mother:  Tamanna Escalera [[id number]]   docusate sodium 100 mg Oral Daily   simethicone 80 mg Oral 4x Daily       Labor Information:      Labor Events      labor: No Induction:  Balloon Dilation    Steroids?  None Reason for Induction:  Elective   Rupture date:  2018 Complications:      Rupture time:  8:10 AM    Rupture type:  artificial rupture of membranes    Fluid Color:  Clear;Meconium Present    Antibiotics during Labor?              Anesthesia     Method: Epidural     Analgesics:          Delivery Information for Manjula Norden " "    YOB: 2016 Delivery Clinician:     Time of birth:  6:13 PM Delivery type:  Vaginal, Spontaneous Delivery   Forceps:     Vacuum:     Breech:      Presentation/position:          Observed Anomalies:   Delivery Complications:         Comments:       APGAR SCORES             APGARS  One minute Five minutes Ten minutes Fifteen minutes Twenty minutes   Skin color: 1   1             Heart rate: 2   2             Grimace: 2   2              Muscle tone: 1   2              Breathin   2              Totals: 7   9                Resuscitation     Suction: bulb syringe   Catheter size:     Suction below cords:     Intensive:       Objective     [place] Information     Vital Signs Temp:  [97.9 °F (36.6 °C)-98.6 °F (37 °C)] 98 °F (36.7 °C)  Pulse:  [120-156] 120  Resp:  [40-76] 44  BP: (67)/(41) 67/41  BP 67/41 (BP Location: Right leg, Patient Position: Lying)   Pulse 120   Temp 98 °F (36.7 °C) (Axillary)   Resp 44   Ht 54.6 cm (21.5\") Comment: Filed from Delivery Summary  Wt 3642 g (8 lb 0.5 oz)   HC 14.17\" (36 cm)   SpO2 96%   BMI 12.21 kg/m²    Admission Vital Signs: Vitals  Temp: 98.4 °F (36.9 °C)  Temp src: Axillary  Pulse: 156  Heart Rate Source: Apical  Resp: 56  Resp Rate Source: Stethoscope  BP: 67/41  Noninvasive MAP (mmHg): 50  BP Location: Right leg  BP Method: Automatic  Patient Position: Lying   Birth Weight: 3685 g (8 lb 2 oz)   Birth Length: 21.5   Birth Head circumference:     Current Weight: Weight: 3642 g (8 lb 0.5 oz)   Change in weight since birth: -1%     Physical Exam     General Vigorous, well-developed infant in NAD.   Head Anterior fontanelle soft and flat.  2+ caput.  Scalp bruising.   Eyes  + RR bilaterally.   Ears, Nose, Throat  Normal ears.  No ear pits.  No ear tags.  Palate intact.   Heart Normal rate and rhythm.  No murmur, gallops.  Femoral pulses 2+.   Lungs Clear to auscultation bilaterally.  No nasal flaring or retractions.   Abdomen Normoactive bowel sounds.  " Soft.  No apparent tenderness.  No masses.  No hepatosplenomegaly.   Genitalia  Normal male.  Testes descended bilaterally.  Lenin stage I.   Anus Anus patent.   Trunk and Spine Spine intact.  No sacral dimples.   Extremities  Clavicles intact.  No hip clicks/clunks.   Neuro + Burton, grasp, suck.  Normal tone.   Skin  No rashes.  No jaundice      Intake and Output     Feeding:  Breast and formula 20mL x 1 thus far.       Labs and Radiology     Prenatal labs:  Reviewed.    Labs:   Recent Results (from the past 96 hour(s))   POC Glucose Once    Collection Time: 18  7:51 PM   Result Value Ref Range    Glucose 35 (C) 75 - 110 mg/dL   POC Glucose Once    Collection Time: 18  7:53 PM   Result Value Ref Range    Glucose 38 (C) 75 - 110 mg/dL   POC Glucose Once    Collection Time: 18  9:18 PM   Result Value Ref Range    Glucose 57 (L) 75 - 110 mg/dL   POC Glucose Once    Collection Time: 18 10:28 PM   Result Value Ref Range    Glucose 52 (L) 75 - 110 mg/dL   POC Glucose Once    Collection Time: 18  6:13 AM   Result Value Ref Range    Glucose 41 (L) 75 - 110 mg/dL     Xrays:  No orders to display       Assessment and Plan     39-1/7 week  via electively-induced vaginal delivery to 38yo G1 mother.  Doing well.  Had one low blood sugar, necessitating glucose gel x 1.  Continue routine  care.      Ruth Gonsalez MD  2018  8:52 AM

## 2022-03-03 RX ORDER — IRBESARTAN 150 MG/1
300 TABLET ORAL NIGHTLY
Qty: 180 TABLET | Refills: 0 | Status: SHIPPED | OUTPATIENT
Start: 2022-03-03 | End: 2022-03-03

## 2022-03-03 RX ORDER — IRBESARTAN 300 MG/1
300 TABLET ORAL NIGHTLY
Qty: 90 TABLET | Refills: 1 | Status: SHIPPED | OUTPATIENT
Start: 2022-03-03 | End: 2022-03-30

## 2022-03-03 NOTE — TELEPHONE ENCOUNTER
Last seen: 01/25/22/ by Dr. Golden  Next appt: 04/05/22 with Dr. Golden    Was the patient seen in the last year in this department? Yes   Does patient have an active prescription for medications requested? No   Received Request Via: Pharmacy

## 2022-03-14 ENCOUNTER — TELEPHONE (OUTPATIENT)
Dept: HEALTH INFORMATION MANAGEMENT | Facility: OTHER | Age: 67
End: 2022-03-14
Payer: MEDICARE

## 2022-03-30 ENCOUNTER — OFFICE VISIT (OUTPATIENT)
Dept: MEDICAL GROUP | Facility: PHYSICIAN GROUP | Age: 67
End: 2022-03-30
Payer: MEDICARE

## 2022-03-30 VITALS
SYSTOLIC BLOOD PRESSURE: 134 MMHG | TEMPERATURE: 97.3 F | OXYGEN SATURATION: 95 % | HEART RATE: 62 BPM | RESPIRATION RATE: 16 BRPM | HEIGHT: 69 IN | DIASTOLIC BLOOD PRESSURE: 72 MMHG | BODY MASS INDEX: 27.64 KG/M2 | WEIGHT: 186.6 LBS

## 2022-03-30 DIAGNOSIS — Z76.89 ENCOUNTER TO ESTABLISH CARE: ICD-10-CM

## 2022-03-30 DIAGNOSIS — Z80.0 FAMILY HISTORY OF COLON CANCER IN MOTHER: ICD-10-CM

## 2022-03-30 DIAGNOSIS — Z86.19 HISTORY OF HEPATITIS C: ICD-10-CM

## 2022-03-30 DIAGNOSIS — E78.5 DYSLIPIDEMIA: ICD-10-CM

## 2022-03-30 DIAGNOSIS — I10 ESSENTIAL HYPERTENSION, BENIGN: ICD-10-CM

## 2022-03-30 DIAGNOSIS — Z12.5 PROSTATE CANCER SCREENING: ICD-10-CM

## 2022-03-30 DIAGNOSIS — Z86.010 HISTORY OF ADENOMATOUS POLYP OF COLON: ICD-10-CM

## 2022-03-30 DIAGNOSIS — D69.6 THROMBOCYTOPENIA (HCC): ICD-10-CM

## 2022-03-30 DIAGNOSIS — M10.072 IDIOPATHIC GOUT, LEFT ANKLE AND FOOT: ICD-10-CM

## 2022-03-30 PROBLEM — H33.20 RETINAL DETACHMENT: Status: RESOLVED | Noted: 2018-04-19 | Resolved: 2022-03-30

## 2022-03-30 PROBLEM — Z86.0101 HISTORY OF ADENOMATOUS POLYP OF COLON: Status: ACTIVE | Noted: 2022-03-30

## 2022-03-30 PROBLEM — K12.0 CANKER SORES ORAL: Status: RESOLVED | Noted: 2021-11-24 | Resolved: 2022-03-30

## 2022-03-30 PROBLEM — R07.89 CHEST DISCOMFORT: Status: RESOLVED | Noted: 2018-04-19 | Resolved: 2022-03-30

## 2022-03-30 PROBLEM — H26.9 CATARACT: Status: RESOLVED | Noted: 2018-04-19 | Resolved: 2022-03-30

## 2022-03-30 PROCEDURE — 99203 OFFICE O/P NEW LOW 30 MIN: CPT | Performed by: FAMILY MEDICINE

## 2022-03-30 RX ORDER — IRBESARTAN 150 MG/1
TABLET ORAL 2 TIMES DAILY
COMMUNITY
Start: 2022-03-15 | End: 2022-10-10 | Stop reason: SDUPTHER

## 2022-03-30 NOTE — ASSESSMENT & PLAN NOTE
This is a chronic problem.  Patient was started on Lipitor and is tolerating it well.  He will be due for labs later this year.

## 2022-03-30 NOTE — ASSESSMENT & PLAN NOTE
This is a chronic problem.  Is noted on previous notes from gastroenterology.  Lab to check this has not been done in a while and will do it later this year.

## 2022-03-30 NOTE — PROGRESS NOTES
Subjective:     CC: Here to establish care.    HPI:   Rodrigo presents today with the following medical concerns:    Encounter to establish care  Patient is here today to establish care.  He is changed over to [facility].  He has no particular concerns on today's visit.  He has had a recent visit with his provider and baseline labs done.    Dyslipidemia  This is a chronic problem.  Patient was started on Lipitor and is tolerating it well.  He will be due for labs later this year.    Essential hypertension, benign  This is a chronic problem.  Blood pressures under good control on current medications.    Family history of colon cancer in mother  This is a chronic historical issue.  Patient does get routine follow-ups with his gastroenterologist.    History of adenomatous polyp of colon  This is a chronic problem.  Patient does follow-up with gastroenterology and will be due for colonoscopy in the next year or so by history.    History of hepatitis C  This is a chronic problem.  Patient was treated several years ago and had resolution of his chronic hepatitis C.  Is followed by gastroenterology.    Idiopathic gout, left ankle and foot  This is a chronic problem.  Patient had a single episode of gout last year.  He was subsequently put on allopurinol and changed his diet.  He has not had any recurrences.    Thrombocytopenia (HCC)  This is a chronic problem.  Is noted on previous notes from gastroenterology.  Lab to check this has not been done in a while and will do it later this year.      History reviewed. No pertinent past medical history.    Social History     Tobacco Use   • Smoking status: Former Smoker     Packs/day: 0.50     Types: Cigarettes     Start date: 1970     Quit date: 5/15/2012     Years since quittin.8   • Smokeless tobacco: Never Used   Vaping Use   • Vaping Use: Never used   Substance Use Topics   • Alcohol use: Not Currently   • Drug use: Never       Current Outpatient Medications  "Ordered in [facility]   Medication Sig Dispense Refill   • irbesartan (AVAPRO) 150 MG Tab 2 times a day.     • allopurinol (ZYLOPRIM) 100 MG Tab Take 1 Tablet by mouth 2 times a day. 180 Tablet 1   • atorvastatin (LIPITOR) 20 MG Tab Take 1 Tablet by mouth every evening. 60 Tablet 4   • aspirin 81 MG EC tablet ASPIRIN 81 MG TABLET       No current Epic-ordered facility-administered medications on file.       Allergies:  Patient has no known allergies.    Health Maintenance: Completed    ROS:  Gen: no fevers/chills, no changes in weight  Eyes: no changes in vision  ENT: no sore throat, no hearing loss, no bloody nose  Pulm: no sob, no cough  CV: no chest pain, no palpitations  GI: no nausea/vomiting, no diarrhea  : no dysuria  MSk: no myalgias  Skin: no rash  Neuro: no headaches, no numbness/tingling  Heme/Lymph: no easy bruising      Objective:       Exam:  /72 (BP Location: Left arm, Patient Position: Sitting, BP Cuff Size: Adult)   Pulse 62   Temp 36.3 °C (97.3 °F) (Temporal)   Resp 16   Ht 1.753 m (5' 9\")   Wt 84.6 kg (186 lb 9.6 oz)   SpO2 95%   BMI 27.56 kg/m²  Body mass index is 27.56 kg/m².    Gen: Alert and oriented, No apparent distress.  Eyes:   Extraocular motions intact.  No scleral icterus seen.  Ears:    Ear canals and TMs are normal.  Neck: Neck is supple without lymphadenopathy.  Thyroid exam is normal.  Lungs: Normal effort, CTA bilaterally, no wheezes, rhonchi, or rales  CV: Regular rate and rhythm. No murmurs, rubs, or gallops.  Abdomen: Soft, nontender, no organomegaly or masses and normal bowel sounds.  Ext: No clubbing, cyanosis, edema.  Neuro: Cranial nerves II through VIII are grossly intact.  No lateralized signs are seen.  Gait is normal.      Labs: Reviewed    Assessment & Plan:     67 y.o. male with the following -     1. Encounter to establish care  Patient establish care with me today.  We will order baseline labs before his next visit in 6 months.  General health care issues " addressed.    2. Essential hypertension, benign  This is a chronic problem.  Is under good control.  Continue to follow.  - Comp Metabolic Panel; Future  - Lipid Profile; Future  - URINALYSIS,CULTURE IF INDICATED; Future  - CBC WITH DIFFERENTIAL; Future  - ESTIMATED GFR; Future    3. History of hepatitis C  This is a chronic problem.  Continue to follow.    4. Dyslipidemia  This is a chronic problem.  Patient is on a statin.  Lab ordered.    5. Idiopathic gout, left ankle and foot  This is a chronic problem.  Continue current care and diet.  Recheck uric acid and next labs.  - URIC ACID; Future    6. History of adenomatous polyp of colon  This is a chronic problem.  Continue follow-up with gastroenterology.  Colonoscopy due next year.    7. Family history of colon cancer in mother  This is a chronic problem.  Continue to follow.  Continue colonoscopies every 5 years.    8. Prostate cancer screening  This is a chronic screening issue.  PSA has not been done in the recent past.  We will ordered with his next set of labs.  - PROSTATE SPECIFIC AG SCREENING; Future    9. Thrombocytopenia (HCC)  This is a chronic problem per chart review.  We will recheck labs before next visit.      Return in about 6 months (around 9/30/2022) for Long.    Please note that this dictation was created using voice recognition software. I have made every reasonable attempt to correct obvious errors, but I expect that there are errors of grammar and possibly content that I did not discover before finalizing the note.

## 2022-03-30 NOTE — ASSESSMENT & PLAN NOTE
This is a chronic problem.  Patient does follow-up with gastroenterology and will be due for colonoscopy in the next year or so by history.

## 2022-03-30 NOTE — ASSESSMENT & PLAN NOTE
This is a chronic problem.  Patient was treated several years ago and had resolution of his chronic hepatitis C.  Is followed by gastroenterology.

## 2022-03-30 NOTE — ASSESSMENT & PLAN NOTE
Patient is here today to establish care.  He is changed over to [facility].  He has no particular concerns on today's visit.  He has had a recent visit with his provider and baseline labs done.

## 2022-03-30 NOTE — ASSESSMENT & PLAN NOTE
This is a chronic historical issue.  Patient does get routine follow-ups with his gastroenterologist.

## 2022-03-30 NOTE — ASSESSMENT & PLAN NOTE
This is a chronic problem.  Patient had a single episode of gout last year.  He was subsequently put on allopurinol and changed his diet.  He has not had any recurrences.

## 2022-05-02 ENCOUNTER — APPOINTMENT (OUTPATIENT)
Dept: MEDICAL GROUP | Facility: PHYSICIAN GROUP | Age: 67
End: 2022-05-02
Payer: MEDICARE

## 2022-05-02 RX ORDER — ALLOPURINOL 100 MG/1
100 TABLET ORAL 2 TIMES DAILY
Qty: 180 TABLET | Refills: 3 | Status: SHIPPED | OUTPATIENT
Start: 2022-05-02 | End: 2022-10-10 | Stop reason: SDUPTHER

## 2022-07-15 ENCOUNTER — TELEPHONE (OUTPATIENT)
Dept: HEMATOLOGY ONCOLOGY | Facility: MEDICAL CENTER | Age: 67
End: 2022-07-15

## 2022-07-15 ENCOUNTER — OFFICE VISIT (OUTPATIENT)
Dept: MEDICAL GROUP | Facility: PHYSICIAN GROUP | Age: 67
End: 2022-07-15
Payer: MEDICARE

## 2022-07-15 ENCOUNTER — HOSPITAL ENCOUNTER (OUTPATIENT)
Dept: LAB | Facility: MEDICAL CENTER | Age: 67
End: 2022-07-15
Attending: FAMILY MEDICINE
Payer: MEDICARE

## 2022-07-15 VITALS
RESPIRATION RATE: 16 BRPM | TEMPERATURE: 97.2 F | HEIGHT: 69 IN | SYSTOLIC BLOOD PRESSURE: 144 MMHG | WEIGHT: 185 LBS | DIASTOLIC BLOOD PRESSURE: 74 MMHG | HEART RATE: 52 BPM | OXYGEN SATURATION: 97 % | BODY MASS INDEX: 27.4 KG/M2

## 2022-07-15 DIAGNOSIS — I10 ESSENTIAL HYPERTENSION, BENIGN: ICD-10-CM

## 2022-07-15 DIAGNOSIS — M10.072 IDIOPATHIC GOUT, LEFT ANKLE AND FOOT: ICD-10-CM

## 2022-07-15 DIAGNOSIS — R06.02 SHORTNESS OF BREATH: ICD-10-CM

## 2022-07-15 DIAGNOSIS — Z12.5 PROSTATE CANCER SCREENING: ICD-10-CM

## 2022-07-15 DIAGNOSIS — Z87.891 HISTORY OF SMOKING 30 OR MORE PACK YEARS: ICD-10-CM

## 2022-07-15 PROBLEM — Z76.89 ENCOUNTER TO ESTABLISH CARE: Status: RESOLVED | Noted: 2022-03-30 | Resolved: 2022-07-15

## 2022-07-15 LAB
ALBUMIN SERPL BCP-MCNC: 4.9 G/DL (ref 3.2–4.9)
ALBUMIN/GLOB SERPL: 2 G/DL
ALP SERPL-CCNC: 92 U/L (ref 30–99)
ALT SERPL-CCNC: 25 U/L (ref 2–50)
ANION GAP SERPL CALC-SCNC: 12 MMOL/L (ref 7–16)
APPEARANCE UR: CLEAR
AST SERPL-CCNC: 20 U/L (ref 12–45)
BASOPHILS # BLD AUTO: 0.7 % (ref 0–1.8)
BASOPHILS # BLD: 0.04 K/UL (ref 0–0.12)
BILIRUB SERPL-MCNC: 1 MG/DL (ref 0.1–1.5)
BILIRUB UR QL STRIP.AUTO: NEGATIVE
BUN SERPL-MCNC: 23 MG/DL (ref 8–22)
CALCIUM SERPL-MCNC: 9.8 MG/DL (ref 8.5–10.5)
CHLORIDE SERPL-SCNC: 103 MMOL/L (ref 96–112)
CHOLEST SERPL-MCNC: 113 MG/DL (ref 100–199)
CO2 SERPL-SCNC: 21 MMOL/L (ref 20–33)
COLOR UR: YELLOW
CREAT SERPL-MCNC: 1.18 MG/DL (ref 0.5–1.4)
EOSINOPHIL # BLD AUTO: 0.17 K/UL (ref 0–0.51)
EOSINOPHIL NFR BLD: 2.9 % (ref 0–6.9)
ERYTHROCYTE [DISTWIDTH] IN BLOOD BY AUTOMATED COUNT: 39.7 FL (ref 35.9–50)
FASTING STATUS PATIENT QL REPORTED: NORMAL
GFR SERPLBLD CREATININE-BSD FMLA CKD-EPI: 68 ML/MIN/1.73 M 2
GLOBULIN SER CALC-MCNC: 2.5 G/DL (ref 1.9–3.5)
GLUCOSE SERPL-MCNC: 94 MG/DL (ref 65–99)
GLUCOSE UR STRIP.AUTO-MCNC: NEGATIVE MG/DL
HCT VFR BLD AUTO: 43.8 % (ref 42–52)
HDLC SERPL-MCNC: 29 MG/DL
HGB BLD-MCNC: 15.4 G/DL (ref 14–18)
IMM GRANULOCYTES # BLD AUTO: 0.02 K/UL (ref 0–0.11)
IMM GRANULOCYTES NFR BLD AUTO: 0.3 % (ref 0–0.9)
KETONES UR STRIP.AUTO-MCNC: NEGATIVE MG/DL
LDLC SERPL CALC-MCNC: 54 MG/DL
LEUKOCYTE ESTERASE UR QL STRIP.AUTO: NEGATIVE
LYMPHOCYTES # BLD AUTO: 1.94 K/UL (ref 1–4.8)
LYMPHOCYTES NFR BLD: 33.3 % (ref 22–41)
MCH RBC QN AUTO: 30.7 PG (ref 27–33)
MCHC RBC AUTO-ENTMCNC: 35.2 G/DL (ref 33.7–35.3)
MCV RBC AUTO: 87.3 FL (ref 81.4–97.8)
MICRO URNS: NORMAL
MONOCYTES # BLD AUTO: 0.7 K/UL (ref 0–0.85)
MONOCYTES NFR BLD AUTO: 12 % (ref 0–13.4)
NEUTROPHILS # BLD AUTO: 2.95 K/UL (ref 1.82–7.42)
NEUTROPHILS NFR BLD: 50.8 % (ref 44–72)
NITRITE UR QL STRIP.AUTO: NEGATIVE
NRBC # BLD AUTO: 0 K/UL
NRBC BLD-RTO: 0 /100 WBC
PH UR STRIP.AUTO: 5.5 [PH] (ref 5–8)
PLATELET # BLD AUTO: 144 K/UL (ref 164–446)
PMV BLD AUTO: 10.6 FL (ref 9–12.9)
POTASSIUM SERPL-SCNC: 4.4 MMOL/L (ref 3.6–5.5)
PROT SERPL-MCNC: 7.4 G/DL (ref 6–8.2)
PROT UR QL STRIP: NEGATIVE MG/DL
PSA SERPL-MCNC: 2.97 NG/ML (ref 0–4)
RBC # BLD AUTO: 5.02 M/UL (ref 4.7–6.1)
RBC UR QL AUTO: NEGATIVE
SODIUM SERPL-SCNC: 136 MMOL/L (ref 135–145)
SP GR UR STRIP.AUTO: 1.02
TRIGL SERPL-MCNC: 148 MG/DL (ref 0–149)
URATE SERPL-MCNC: 4.1 MG/DL (ref 2.5–8.3)
UROBILINOGEN UR STRIP.AUTO-MCNC: 0.2 MG/DL
WBC # BLD AUTO: 5.8 K/UL (ref 4.8–10.8)

## 2022-07-15 PROCEDURE — 84153 ASSAY OF PSA TOTAL: CPT

## 2022-07-15 PROCEDURE — 84550 ASSAY OF BLOOD/URIC ACID: CPT

## 2022-07-15 PROCEDURE — 81003 URINALYSIS AUTO W/O SCOPE: CPT

## 2022-07-15 PROCEDURE — 80061 LIPID PANEL: CPT

## 2022-07-15 PROCEDURE — 36415 COLL VENOUS BLD VENIPUNCTURE: CPT

## 2022-07-15 PROCEDURE — 85025 COMPLETE CBC W/AUTO DIFF WBC: CPT

## 2022-07-15 PROCEDURE — 99214 OFFICE O/P EST MOD 30 MIN: CPT | Performed by: FAMILY MEDICINE

## 2022-07-15 PROCEDURE — 80053 COMPREHEN METABOLIC PANEL: CPT

## 2022-07-15 RX ORDER — AMLODIPINE BESYLATE 5 MG/1
5 TABLET ORAL DAILY
Qty: 100 TABLET | Refills: 1 | Status: SHIPPED | OUTPATIENT
Start: 2022-07-15 | End: 2022-10-10 | Stop reason: SDUPTHER

## 2022-07-15 RX ORDER — ALBUTEROL SULFATE 90 UG/1
1-2 AEROSOL, METERED RESPIRATORY (INHALATION) EVERY 6 HOURS PRN
Qty: 1 EACH | Refills: 5 | Status: SHIPPED | OUTPATIENT
Start: 2022-07-15 | End: 2022-10-10 | Stop reason: SDUPTHER

## 2022-07-15 NOTE — ASSESSMENT & PLAN NOTE
This is a chronic problem.  Patient is asking if he can stop his allopurinol.  We discussed it and has had some rather severe flares in the past.  After discussion he has decided to continue on it.

## 2022-07-15 NOTE — ASSESSMENT & PLAN NOTE
This is a chronic recurrent problem.  Patient states that he is not definitely feeling short of breath but he feels an odd sensation when he breathes it bothers him.  Sometimes he has some burning when he breathes.  Seems to happen when he is just sitting at home not doing anything.  It does not bother him when he is jogging or exercising.  He thinks he had a lot of bronchial problems when he was a child but never was officially diagnosed with asthma but his parents did not believe in going to doctors.  He did have a bad bronchitis last December that has resolved.    On review of his chart he did have a CT of his lungs for cancer screening ordered last year that he never did.  He would like to get that reordered.  He is concerned about the risk of cancer given his mother's history.

## 2022-07-15 NOTE — ASSESSMENT & PLAN NOTE
This is a chronic problem.  Patient brings in his blood pressure readings.  The systolic has been often above 150.  The diastolics are below 80.  Blood pressure today is a little higher than I would like as well.

## 2022-07-15 NOTE — PROGRESS NOTES
Subjective:     CC: Here for several issues.    HPI:   Rodrigo presents today with the following medical concerns:    Shortness of breath  This is a chronic recurrent problem.  Patient states that he is not definitely feeling short of breath but he feels an odd sensation when he breathes it bothers him.  Sometimes he has some burning when he breathes.  Seems to happen when he is just sitting at home not doing anything.  It does not bother him when he is jogging or exercising.  He thinks he had a lot of bronchial problems when he was a child but never was officially diagnosed with asthma but his parents did not believe in going to doctors.  He did have a bad bronchitis last December that has resolved.    On review of his chart he did have a CT of his lungs for cancer screening ordered last year that he never did.  He would like to get that reordered.  He is concerned about the risk of cancer given his mother's history.    Essential hypertension, benign  This is a chronic problem.  Patient brings in his blood pressure readings.  The systolic has been often above 150.  The diastolics are below 80.  Blood pressure today is a little higher than I would like as well.    Idiopathic gout, left ankle and foot  This is a chronic problem.  Patient is asking if he can stop his allopurinol.  We discussed it and has had some rather severe flares in the past.  After discussion he has decided to continue on it.      History reviewed. No pertinent past medical history.    Social History     Tobacco Use   • Smoking status: Former Smoker     Packs/day: 0.50     Types: Cigarettes     Start date: 11/24/1970     Quit date: 5/15/2012     Years since quitting: 10.1   • Smokeless tobacco: Never Used   Vaping Use   • Vaping Use: Never used   Substance Use Topics   • Alcohol use: Not Currently   • Drug use: Never       Current Outpatient Medications Ordered in Epic   Medication Sig Dispense Refill   • amLODIPine (NORVASC) 5 MG Tab Take 1  "Tablet by mouth every day. 100 Tablet 1   • albuterol 108 (90 Base) MCG/ACT Aero Soln inhalation aerosol Inhale 1-2 Puffs every 6 hours as needed for Shortness of Breath. 1 Each 5   • allopurinol (ZYLOPRIM) 100 MG Tab Take 1 Tablet by mouth 2 times a day. 180 Tablet 3   • irbesartan (AVAPRO) 150 MG Tab 2 times a day.     • atorvastatin (LIPITOR) 20 MG Tab Take 1 Tablet by mouth every evening. 60 Tablet 4   • aspirin 81 MG EC tablet ASPIRIN 81 MG TABLET       No current Epic-ordered facility-administered medications on file.       Allergies:  Patient has no known allergies.    Health Maintenance: Completed    ROS:  Gen: no fevers/chills, no changes in weight  Eyes: no changes in vision  ENT: no sore throat, no hearing loss, no bloody nose  Pulm:  no cough  CV: no chest pain, no palpitations  GI: no nausea/vomiting, no diarrhea  : no dysuria  MSk: no myalgias  Skin: no rash  Neuro: no headaches, no numbness/tingling  Heme/Lymph: no easy bruising      Objective:       Exam:  BP (!) 144/74 (BP Location: Left arm, Patient Position: Sitting, BP Cuff Size: Adult)   Pulse (!) 52   Temp 36.2 °C (97.2 °F) (Temporal)   Resp 16   Ht 1.753 m (5' 9\")   Wt 83.9 kg (185 lb)   SpO2 97%   BMI 27.32 kg/m²  Body mass index is 27.32 kg/m².    Gen: Alert and oriented, No apparent distress.  Neck: Neck is supple without lymphadenopathy.  Lungs: Normal effort, CTA bilaterally, no wheezes, rhonchi, or rales  CV: Regular rate and rhythm. No murmurs, rubs, or gallops.  Ext: No clubbing, cyanosis, edema.        Assessment & Plan:     67 y.o. male with the following -     1. Essential hypertension, benign  This is a chronic problem.  We will add on amlodipine and have him come back in 2 weeks for blood pressure check.    2. Shortness of breath  This is a chronic problem.  Patient told it might be related to allergies or even some mild asthma.  I discussed doing a methacholine challenge test and he declined that.  We will give him an " inhaler to try and get a chest x-ray.  We will also go ahead and reorder his CT lung test.  - DX-CHEST-2 VIEWS; Future    3. History of smoking 30 or more pack years  This is a chronic problem.  CT lung scan ordered.  - CT-LUNG CANCER-SCREENING; Future  - REFERRAL TO LUNG CANCER SCREENING PROGRAM; Future    4. Idiopathic gout, left ankle and foot  This is a chronic problem.  Continue on allopurinol for now.      Return in about 2 weeks (around 7/29/2022) for BP check.    Please note that this dictation was created using voice recognition software. I have made every reasonable attempt to correct obvious errors, but I expect that there are errors of grammar and possibly content that I did not discover before finalizing the note.

## 2022-07-15 NOTE — TELEPHONE ENCOUNTER
Received referral to lung cancer screening program.  Chart review to assess for lung cancer screening program eligibility.   1. Age 55-77 yrs of age? Yes 67 y.o.  2. 30 pack year hx of smoking, or greater? Yes 1 [id number]= [id number] hx  3. Current smoker or if quit, has pt quit within last 15 yrs?Yes  Quit 2010  4. Any signs or symptoms of lung cancer? None noted  5. Previous history of lung cancer? None noted  6. Chest CT within past 12 mos.? None noted  Patient does meet eligibility criteria. [facility] scheduling notified to schedule the shared decision making visit.

## 2022-07-19 ENCOUNTER — HOSPITAL ENCOUNTER (OUTPATIENT)
Dept: RADIOLOGY | Facility: MEDICAL CENTER | Age: 67
End: 2022-07-19
Attending: FAMILY MEDICINE
Payer: MEDICARE

## 2022-07-19 DIAGNOSIS — Z87.891 HISTORY OF SMOKING 30 OR MORE PACK YEARS: ICD-10-CM

## 2022-07-19 DIAGNOSIS — R06.02 SHORTNESS OF BREATH: ICD-10-CM

## 2022-07-19 PROCEDURE — 71271 CT THORAX LUNG CANCER SCR C-: CPT

## 2022-07-19 PROCEDURE — 71046 X-RAY EXAM CHEST 2 VIEWS: CPT

## 2022-07-20 ENCOUNTER — TELEPHONE (OUTPATIENT)
Dept: MEDICAL GROUP | Facility: PHYSICIAN GROUP | Age: 67
End: 2022-07-20
Payer: MEDICARE

## 2022-07-20 NOTE — TELEPHONE ENCOUNTER
----- Message from Wayne Almazan III, M.D. sent at 7/19/2022 12:42 PM PDT -----  Let him know that his chest x-ray was normal.  The CT scan did shows 2 nodules in the lungs which the radiologist thought were benign.  However they recommend redoing a CT scan in 6 months to make sure they are not changing.  Also had a little bit of emphysema changes in his lungs otherwise unremarkable.    Dr. Almazan

## 2022-07-20 NOTE — TELEPHONE ENCOUNTER
Phone Number Called:  [phone]      Call outcome: Called the pt.  Provided the information from the message from Dr. Black. pt verbalized understanding.    Pt wanted to know what to do as he still has the burning sensation in his lungs and the spray that was prescribed has been working.

## 2022-07-20 NOTE — TELEPHONE ENCOUNTER
----- Message from Wayne Almazan III, M.D. sent at 7/18/2022  7:48 AM PDT -----  Regarding: Labs  Please tell Rodrigo that his labs all looked good except his platelet count was slightly below normal.  He has had this in the past.  Recheck his labs in 1 year.    Dr. Almazan

## 2022-09-15 ENCOUNTER — TELEPHONE (OUTPATIENT)
Dept: HEALTH INFORMATION MANAGEMENT | Facility: OTHER | Age: 67
End: 2022-09-15
Payer: MEDICARE

## 2022-09-30 ENCOUNTER — OFFICE VISIT (OUTPATIENT)
Dept: MEDICAL GROUP | Facility: PHYSICIAN GROUP | Age: 67
End: 2022-09-30
Payer: MEDICARE

## 2022-09-30 VITALS
DIASTOLIC BLOOD PRESSURE: 78 MMHG | RESPIRATION RATE: 18 BRPM | BODY MASS INDEX: 27.89 KG/M2 | HEART RATE: 64 BPM | OXYGEN SATURATION: 97 % | SYSTOLIC BLOOD PRESSURE: 132 MMHG | HEIGHT: 69 IN | WEIGHT: 188.3 LBS | TEMPERATURE: 97.2 F

## 2022-09-30 DIAGNOSIS — Z23 NEED FOR VACCINATION: ICD-10-CM

## 2022-09-30 DIAGNOSIS — I10 ESSENTIAL HYPERTENSION, BENIGN: ICD-10-CM

## 2022-09-30 DIAGNOSIS — R91.8 LUNG NODULE, MULTIPLE: ICD-10-CM

## 2022-09-30 DIAGNOSIS — D69.6 THROMBOCYTOPENIA (HCC): ICD-10-CM

## 2022-09-30 PROBLEM — M25.511 PAIN OF BOTH SHOULDER JOINTS: Status: RESOLVED | Noted: 2021-03-08 | Resolved: 2022-09-30

## 2022-09-30 PROBLEM — M25.512 PAIN OF BOTH SHOULDER JOINTS: Status: RESOLVED | Noted: 2021-03-08 | Resolved: 2022-09-30

## 2022-09-30 PROCEDURE — G0008 ADMIN INFLUENZA VIRUS VAC: HCPCS | Performed by: FAMILY MEDICINE

## 2022-09-30 PROCEDURE — 99213 OFFICE O/P EST LOW 20 MIN: CPT | Mod: 25 | Performed by: FAMILY MEDICINE

## 2022-09-30 PROCEDURE — 90662 IIV NO PRSV INCREASED AG IM: CPT | Performed by: FAMILY MEDICINE

## 2022-09-30 ASSESSMENT — FIBROSIS 4 INDEX: FIB4 SCORE: 1.805555555555555556

## 2022-09-30 NOTE — ASSESSMENT & PLAN NOTE
This is a new problem.  Patient had a screening lung CT and was found to have a nodule in the right and left lower lung.  Both of them look benign.  Radiologist recommended follow-up in a year.

## 2022-09-30 NOTE — ASSESSMENT & PLAN NOTE
This is a chronic condition.  Patient finally came back and have his blood pressure rechecked.  States he is doing well on his current medications.

## 2022-09-30 NOTE — PROGRESS NOTES
Subjective:     CC: Here for follow-up.    HPI:   Rodrigo presents today with the following medical concerns:    Essential hypertension, benign  This is a chronic condition.  Patient finally came back and have his blood pressure rechecked.  States he is doing well on his current medications.    Lung nodule, multiple  This is a new problem.  Patient had a screening lung CT and was found to have a nodule in the right and left lower lung.  Both of them look benign.  Radiologist recommended follow-up in a year.    Thrombocytopenia (HCC)  This is a chronic problem.  I discussed patient's lab test with him and he appears to have a stable thrombocytopenia.  We will continue to follow annually    History reviewed. No pertinent past medical history.    Social History     Tobacco Use    Smoking status: Former     Packs/day: 0.50     Types: Cigarettes     Start date: 11/24/1970     Quit date: 5/15/2012     Years since quitting: 10.3    Smokeless tobacco: Never   Vaping Use    Vaping Use: Never used   Substance Use Topics    Alcohol use: Not Currently    Drug use: Never       Current Outpatient Medications Ordered in Epic   Medication Sig Dispense Refill    amLODIPine (NORVASC) 5 MG Tab Take 1 Tablet by mouth every day. 100 Tablet 1    albuterol 108 (90 Base) MCG/ACT Aero Soln inhalation aerosol Inhale 1-2 Puffs every 6 hours as needed for Shortness of Breath. 1 Each 5    allopurinol (ZYLOPRIM) 100 MG Tab Take 1 Tablet by mouth 2 times a day. 180 Tablet 3    irbesartan (AVAPRO) 150 MG Tab 2 times a day.      atorvastatin (LIPITOR) 20 MG Tab Take 1 Tablet by mouth every evening. 60 Tablet 4    aspirin 81 MG EC tablet ASPIRIN 81 MG TABLET       No current Epic-ordered facility-administered medications on file.       Allergies:  Patient has no known allergies.    Health Maintenance: Completed    ROS:  Gen: no fevers/chills, no changes in weight  Eyes: no changes in vision  ENT: no sore throat, no hearing loss, no bloody  "nose  Pulm: no sob, no cough  CV: no chest pain, no palpitations  GI: no nausea/vomiting, no diarrhea  : no dysuria  MSk: no myalgias  Skin: no rash  Neuro: no headaches, no numbness/tingling  Heme/Lymph: no easy bruising      Objective:       Exam:  /78 (BP Location: Right arm, Patient Position: Sitting, BP Cuff Size: Adult)   Pulse 64   Temp 36.2 °C (97.2 °F) (Temporal)   Resp 18   Ht 1.753 m (5' 9\")   Wt 85.4 kg (188 lb 4.8 oz)   SpO2 97%   BMI 27.81 kg/m²  Body mass index is 27.81 kg/m².    Gen: Alert and oriented, No apparent distress.  Ext: No clubbing, cyanosis, edema.      Labs: Reviewed with patient    Assessment & Plan:     67 y.o. male with the following -     1. Need for vaccination  Vaccine given today.  - Influenza Vaccine, High Dose (65+ Only)    2. Lung nodule, multiple  This is a new problem.  We will recheck a CT scan in a year.  Discussed with patient.    3. Essential hypertension, benign  This is a chronic problem under good control.  Continue current medications.    4. Thrombocytopenia (HCC)  This is a chronic problem.  Continue to monitor.      Return in about 6 months (around 3/30/2023) for Long.    Please note that this dictation was created using voice recognition software. I have made every reasonable attempt to correct obvious errors, but I expect that there are errors of grammar and possibly content that I did not discover before finalizing the note.        "

## 2022-09-30 NOTE — ASSESSMENT & PLAN NOTE
This is a chronic problem.  I discussed patient's lab test with him and he appears to have a stable thrombocytopenia.  We will continue to follow annually

## 2022-10-10 RX ORDER — ATORVASTATIN CALCIUM 20 MG/1
20 TABLET, FILM COATED ORAL NIGHTLY
Qty: 100 TABLET | Refills: 3 | Status: SHIPPED | OUTPATIENT
Start: 2022-10-10 | End: 2024-01-04 | Stop reason: SDUPTHER

## 2022-10-10 RX ORDER — AMLODIPINE BESYLATE 5 MG/1
5 TABLET ORAL DAILY
Qty: 100 TABLET | Refills: 1 | Status: SHIPPED | OUTPATIENT
Start: 2022-10-10 | End: 2023-05-03

## 2022-10-10 RX ORDER — IRBESARTAN 150 MG/1
150 TABLET ORAL 2 TIMES DAILY
Qty: 200 TABLET | Refills: 3 | Status: SHIPPED | OUTPATIENT
Start: 2022-10-10 | End: 2023-11-13

## 2022-10-10 RX ORDER — ALBUTEROL SULFATE 90 UG/1
1-2 AEROSOL, METERED RESPIRATORY (INHALATION) EVERY 6 HOURS PRN
Qty: 1 EACH | Refills: 5 | Status: SHIPPED | OUTPATIENT
Start: 2022-10-10

## 2022-10-10 RX ORDER — ALLOPURINOL 100 MG/1
100 TABLET ORAL 2 TIMES DAILY
Qty: 180 TABLET | Refills: 3 | Status: SHIPPED | OUTPATIENT
Start: 2022-10-10 | End: 2023-10-19

## 2022-11-14 ENCOUNTER — PATIENT OUTREACH (OUTPATIENT)
Dept: ONCOLOGY | Facility: MEDICAL CENTER | Age: 67
End: 2022-11-14
Payer: MEDICARE

## 2022-12-07 ENCOUNTER — OFFICE VISIT (OUTPATIENT)
Dept: MEDICAL GROUP | Facility: PHYSICIAN GROUP | Age: 67
End: 2022-12-07
Payer: MEDICARE

## 2022-12-07 VITALS
HEIGHT: 69 IN | SYSTOLIC BLOOD PRESSURE: 124 MMHG | DIASTOLIC BLOOD PRESSURE: 68 MMHG | TEMPERATURE: 97.8 F | RESPIRATION RATE: 18 BRPM | HEART RATE: 60 BPM | OXYGEN SATURATION: 97 % | WEIGHT: 188 LBS | BODY MASS INDEX: 27.85 KG/M2

## 2022-12-07 DIAGNOSIS — J02.0 STREP PHARYNGITIS: ICD-10-CM

## 2022-12-07 DIAGNOSIS — R35.0 URINARY FREQUENCY: ICD-10-CM

## 2022-12-07 PROCEDURE — 99214 OFFICE O/P EST MOD 30 MIN: CPT | Performed by: FAMILY MEDICINE

## 2022-12-07 RX ORDER — AZITHROMYCIN 250 MG/1
TABLET, FILM COATED ORAL
Qty: 6 TABLET | Refills: 0 | Status: SHIPPED | OUTPATIENT
Start: 2022-12-07 | End: 2023-03-17

## 2022-12-07 ASSESSMENT — FIBROSIS 4 INDEX: FIB4 SCORE: 1.805555555555555556

## 2022-12-07 NOTE — PROGRESS NOTES
Subjective:     CC: Here for several issues.    HPI:   Rodrigo presents today with the following medical concerns:    Strep pharyngitis  This is a new problem.  Patient states that a week ago he had his teeth cleaned and the following day had a severe onset of sore throat.  That has persisted until now.  He also has a little bit of a dry cough that just developed.  Denies any fever or chills.    Urinary frequency  This is a new problem.  Patient states has had troubles with urinary frequency during the day.  Sometimes feels like he has not completely emptied.  He only gets up about once at night.  No dysuria or CVA pain.    History reviewed. No pertinent past medical history.    Social History     Tobacco Use    Smoking status: Former     Packs/day: 0.50     Types: Cigarettes     Start date: 11/24/1970     Quit date: 5/15/2012     Years since quitting: 10.5    Smokeless tobacco: Never   Vaping Use    Vaping Use: Never used   Substance Use Topics    Alcohol use: Not Currently    Drug use: Never       Current Outpatient Medications Ordered in Epic   Medication Sig Dispense Refill    azithromycin (ZITHROMAX) 250 MG Tab Take 2 po today and then 1 a day 6 Tablet 0    albuterol 108 (90 Base) MCG/ACT Aero Soln inhalation aerosol Inhale 1-2 Puffs every 6 hours as needed for Shortness of Breath. 1 Each 5    allopurinol (ZYLOPRIM) 100 MG Tab Take 1 Tablet by mouth 2 times a day. 180 Tablet 3    amLODIPine (NORVASC) 5 MG Tab Take 1 Tablet by mouth every day. 100 Tablet 1    atorvastatin (LIPITOR) 20 MG Tab Take 1 Tablet by mouth every evening. 100 Tablet 3    irbesartan (AVAPRO) 150 MG Tab Take 1 Tablet by mouth 2 times a day. 200 Tablet 3    aspirin 81 MG EC tablet ASPIRIN 81 MG TABLET       No current Epic-ordered facility-administered medications on file.       Allergies:  Patient has no known allergies.    Health Maintenance: Completed    ROS:  Gen: no fevers/chills, no changes in weight  Eyes: no changes in vision  ENT:   "no hearing loss, no bloody nose  Pulm: no sob,   CV: no chest pain, no palpitations  GI: no nausea/vomiting, no diarrhea  : no dysuria  MSk: no myalgias  Skin: no rash  Neuro: no headaches, no numbness/tingling  Heme/Lymph: no easy bruising      Objective:       Exam:  /68 (BP Location: Left arm, Patient Position: Sitting, BP Cuff Size: Adult)   Pulse 60   Temp 36.6 °C (97.8 °F) (Temporal)   Resp 18   Ht 1.753 m (5' 9\")   Wt 85.3 kg (188 lb)   SpO2 97%   BMI 27.76 kg/m²  Body mass index is 27.76 kg/m².    Gen: Alert and oriented, No apparent distress.  ENT:    Ear canals and TMs are clear.  Throat is very erythematous with some white exudate on the left side.  Nose is clear.  Neck: Neck is supple without lymphadenopathy.  Anterior cervical nodes are not enlarged but the ones on the left side are mildly tender.  Lungs: Normal effort, CTA bilaterally, no wheezes, rhonchi, or rales  CV: Regular rate and rhythm. No murmurs, rubs, or gallops.  Ext: No clubbing, cyanosis, edema.      Assessment & Plan:     67 y.o. male with the following -     1. Strep pharyngitis  This is an acute problem.  Patient appears to have strep and we will go ahead and treat him for that.  Prescription for Zithromax faxed in.  Continue throat lozenges and spray as needed.    2. Urinary frequency  This is a new problem.  Patient was told given his symptoms and his PSA value this most likely is due to BPH.  I discussed the possibility of a trial of medicine like Flomax but he does not want to do that at the present time.  We will continue to monitor.      Return if symptoms worsen or fail to improve.    Please note that this dictation was created using voice recognition software. I have made every reasonable attempt to correct obvious errors, but I expect that there are errors of grammar and possibly content that I did not discover before finalizing the note.        "

## 2022-12-07 NOTE — ASSESSMENT & PLAN NOTE
This is a new problem.  Patient states that a week ago he had his teeth cleaned and the following day had a severe onset of sore throat.  That has persisted until now.  He also has a little bit of a dry cough that just developed.  Denies any fever or chills.

## 2022-12-07 NOTE — ASSESSMENT & PLAN NOTE
This is a new problem.  Patient states has had troubles with urinary frequency during the day.  Sometimes feels like he has not completely emptied.  He only gets up about once at night.  No dysuria or CVA pain.

## 2023-03-17 ENCOUNTER — OFFICE VISIT (OUTPATIENT)
Dept: MEDICAL GROUP | Facility: PHYSICIAN GROUP | Age: 68
End: 2023-03-17
Payer: MEDICARE

## 2023-03-17 VITALS
BODY MASS INDEX: 28.49 KG/M2 | DIASTOLIC BLOOD PRESSURE: 62 MMHG | OXYGEN SATURATION: 96 % | WEIGHT: 188 LBS | TEMPERATURE: 97.2 F | SYSTOLIC BLOOD PRESSURE: 138 MMHG | HEART RATE: 60 BPM | RESPIRATION RATE: 20 BRPM | HEIGHT: 68 IN

## 2023-03-17 DIAGNOSIS — I10 ESSENTIAL HYPERTENSION, BENIGN: ICD-10-CM

## 2023-03-17 DIAGNOSIS — R35.0 URINARY FREQUENCY: ICD-10-CM

## 2023-03-17 DIAGNOSIS — R91.8 LUNG NODULE, MULTIPLE: ICD-10-CM

## 2023-03-17 DIAGNOSIS — D69.6 THROMBOCYTOPENIA (HCC): ICD-10-CM

## 2023-03-17 DIAGNOSIS — Z86.010 HISTORY OF ADENOMATOUS POLYP OF COLON: ICD-10-CM

## 2023-03-17 PROBLEM — R06.02 SHORTNESS OF BREATH: Status: RESOLVED | Noted: 2022-07-15 | Resolved: 2023-03-17

## 2023-03-17 PROBLEM — J02.0 STREP PHARYNGITIS: Status: RESOLVED | Noted: 2022-12-07 | Resolved: 2023-03-17

## 2023-03-17 PROCEDURE — 99214 OFFICE O/P EST MOD 30 MIN: CPT | Performed by: FAMILY MEDICINE

## 2023-03-17 ASSESSMENT — FIBROSIS 4 INDEX: FIB4 SCORE: 1.833333333333333333

## 2023-03-17 ASSESSMENT — PATIENT HEALTH QUESTIONNAIRE - PHQ9: CLINICAL INTERPRETATION OF PHQ2 SCORE: 0

## 2023-03-17 NOTE — PROGRESS NOTES
Subjective:     CC: Here for follow-up and to discuss several issues.    HPI:   Rodrigo presents today with the following medical concerns:    Essential hypertension, benign  This is a chronic problem.  Patient brings in his blood pressure checks from home and on average they look very good.  He is not have any current troubles on his current medications.    History of adenomatous polyp of colon  This is a chronic problem.  He is due for colonoscopy this year.  He states he will contact [facility] is as where he is always gone.  If he needs a referral he will let me know.    Lung nodule, multiple  This is a chronic problem.  Patient is to have follow-up lung CT when we see him back in the fall.    Thrombocytopenia (HCC)  This is a chronic problem.  Follow-up lab will be done at next visit in 6 months.    Urinary frequency  This is a chronic problem.  Patient states he has been drinking cranberry juice lately and his urinary frequency seems to have improved.  We will continue to follow.    History reviewed. No pertinent past medical history.    Social History     Tobacco Use    Smoking status: Former     Packs/day: 0.50     Types: Cigarettes     Start date: 11/24/1970     Quit date: 5/15/2012     Years since quitting: 10.8    Smokeless tobacco: Never   Vaping Use    Vaping Use: Never used   Substance Use Topics    Alcohol use: Not Currently    Drug use: Never       Current Outpatient Medications Ordered in Epic   Medication Sig Dispense Refill    albuterol 108 (90 Base) MCG/ACT Aero Soln inhalation aerosol Inhale 1-2 Puffs every 6 hours as needed for Shortness of Breath. 1 Each 5    allopurinol (ZYLOPRIM) 100 MG Tab Take 1 Tablet by mouth 2 times a day. 180 Tablet 3    amLODIPine (NORVASC) 5 MG Tab Take 1 Tablet by mouth every day. 100 Tablet 1    atorvastatin (LIPITOR) 20 MG Tab Take 1 Tablet by mouth every evening. 100 Tablet 3    irbesartan (AVAPRO) 150 MG Tab Take 1 Tablet by mouth 2 times a day. 200 Tablet  "3    aspirin 81 MG EC tablet ASPIRIN 81 MG TABLET       No current Epic-ordered facility-administered medications on file.       Allergies:  Patient has no known allergies.    Health Maintenance: Completed    ROS:  Gen: no fevers/chills, no changes in weight  Eyes: no changes in vision  ENT: no sore throat, no hearing loss, no bloody nose  Pulm: no sob, no cough  CV: no chest pain, no palpitations  GI: no nausea/vomiting, no diarrhea  : no dysuria  MSk: no myalgias  Skin: no rash  Neuro: no headaches, no numbness/tingling  Heme/Lymph: no easy bruising      Objective:       Exam:  /62 (BP Location: Left arm, Patient Position: Sitting, BP Cuff Size: Adult)   Pulse 60   Temp 36.2 °C (97.2 °F) (Temporal)   Resp 20   Ht 1.727 m (5' 8\")   Wt 85.3 kg (188 lb)   SpO2 96%   BMI 28.59 kg/m²  Body mass index is 28.59 kg/m².    Gen: Alert and oriented, No apparent distress.  Neck: Neck is supple without lymphadenopathy.  Lungs: Normal effort, CTA bilaterally, no wheezes, rhonchi, or rales  CV: Regular rate and rhythm. No murmurs, rubs, or gallops.  Ext: No clubbing, cyanosis, edema.  Neuro: Cranial nerves II through grossly intact.  No lateralized signs are seen.  Gait is normal.      Labs: We will order in 6 months.    Assessment & Plan:     68 y.o. male with the following -     1. Thrombocytopenia (HCC)  This is a chronic problem.  Continue to follow.    2. Urinary frequency  This is a chronic problem.  He is improved with the cranberry juice we will follow for now.  We did discuss the possibilities of BPH.    3. Lung nodule, multiple  This is a chronic problem.  CT scan of be due in 6 months.    4. History of adenomatous polyp of colon  This is a chronic problem.  Patient will call digestive health to schedule his colonoscopy.    5. Essential hypertension, benign  This is a chronic stable condition.  Recheck in 6 months.  Continue current medications.    HCC Gap Form    Diagnosis to address: D69.6 - " Thrombocytopenia (HCC)  Assessment and plan: Chronic, stable. Continue with current defined treatment plan: Chronic problem.  We will recheck later this year.. Follow-up at least annually.  Last edited 03/17/23 10:15 PDT by Wayne Almazan III, M.D.         Return in about 6 months (around 9/17/2023) for Long.    Please note that this dictation was created using voice recognition software. I have made every reasonable attempt to correct obvious errors, but I expect that there are errors of grammar and possibly content that I did not discover before finalizing the note.

## 2023-03-17 NOTE — ASSESSMENT & PLAN NOTE
This is a chronic problem.  Patient is to have follow-up lung CT when we see him back in the fall.

## 2023-03-17 NOTE — ASSESSMENT & PLAN NOTE
This is a chronic problem.  Patient states he has been drinking cranberry juice lately and his urinary frequency seems to have improved.  We will continue to follow.

## 2023-03-17 NOTE — ASSESSMENT & PLAN NOTE
This is a chronic problem.  Patient brings in his blood pressure checks from home and on average they look very good.  He is not have any current troubles on his current medications.

## 2023-03-17 NOTE — ASSESSMENT & PLAN NOTE
This is a chronic problem.  He is due for colonoscopy this year.  He states he will contact [facility] is as where he is always gone.  If he needs a referral he will let me know.

## 2023-04-26 ENCOUNTER — TELEPHONE (OUTPATIENT)
Dept: HEALTH INFORMATION MANAGEMENT | Facility: OTHER | Age: 68
End: 2023-04-26
Payer: MEDICARE

## 2023-04-27 ENCOUNTER — TELEPHONE (OUTPATIENT)
Dept: SLEEP MEDICINE | Facility: MEDICAL CENTER | Age: 68
End: 2023-04-27
Payer: MEDICARE

## 2023-04-27 NOTE — TELEPHONE ENCOUNTER
Case discussed with Dr. Almazan who will follow-up with Rodrigo for Lung Rads 3 monitoring. -Dr. Wilda Smith

## 2023-04-27 NOTE — TELEPHONE ENCOUNTER
Record reviewed because pt had Lung Rads 3 CT 7/19/22 and is due for follow-up CT.  No follow-up CT noted in Epic.  Message sent to his PCP offering to follow-up with patient and order  Rodrigo's follow-up CT vs having PCP order follow-up CT. -Dr. Wilda Smith (Lung Nodule Provider)

## 2023-05-03 RX ORDER — AMLODIPINE BESYLATE 5 MG/1
5 TABLET ORAL DAILY
Qty: 100 TABLET | Refills: 3 | Status: SHIPPED | OUTPATIENT
Start: 2023-05-03

## 2023-06-08 ENCOUNTER — TELEPHONE (OUTPATIENT)
Dept: HEALTH INFORMATION MANAGEMENT | Facility: OTHER | Age: 68
End: 2023-06-08
Payer: MEDICARE

## 2023-06-08 NOTE — TELEPHONE ENCOUNTER
Patient stated he spoke with his gastroenterologist and was notified he is not due for another 2 years as he completed a colonoscopy in 2020. Gastroenterologist stated he will need to get it done every 5 years.

## 2023-08-16 ENCOUNTER — PATIENT OUTREACH (OUTPATIENT)
Dept: ONCOLOGY | Facility: MEDICAL CENTER | Age: 68
End: 2023-08-16
Payer: MEDICARE

## 2023-08-16 NOTE — PROGRESS NOTES
Chart review for high risk lung  Lung RADS 3 7/19/22    No recent scans in Epic and no future scan appointments listed in Epic.  Inbasket message sent to pcp    Pt does have appointment with pcp - Dr Almazan on 9/22/23

## 2023-09-22 ENCOUNTER — OFFICE VISIT (OUTPATIENT)
Dept: MEDICAL GROUP | Facility: PHYSICIAN GROUP | Age: 68
End: 2023-09-22
Payer: MEDICARE

## 2023-09-22 VITALS
RESPIRATION RATE: 18 BRPM | WEIGHT: 174.8 LBS | HEART RATE: 54 BPM | DIASTOLIC BLOOD PRESSURE: 58 MMHG | OXYGEN SATURATION: 97 % | TEMPERATURE: 96.4 F | BODY MASS INDEX: 27.44 KG/M2 | HEIGHT: 67 IN | SYSTOLIC BLOOD PRESSURE: 142 MMHG

## 2023-09-22 DIAGNOSIS — Z13.6 ENCOUNTER FOR ABDOMINAL AORTIC ANEURYSM SCREENING: ICD-10-CM

## 2023-09-22 DIAGNOSIS — I10 ESSENTIAL HYPERTENSION, BENIGN: ICD-10-CM

## 2023-09-22 DIAGNOSIS — R91.8 LUNG NODULE, MULTIPLE: ICD-10-CM

## 2023-09-22 DIAGNOSIS — M10.072 IDIOPATHIC GOUT, LEFT ANKLE AND FOOT: ICD-10-CM

## 2023-09-22 DIAGNOSIS — D69.6 THROMBOCYTOPENIA (HCC): ICD-10-CM

## 2023-09-22 DIAGNOSIS — E78.5 DYSLIPIDEMIA: ICD-10-CM

## 2023-09-22 DIAGNOSIS — Z86.010 HISTORY OF ADENOMATOUS POLYP OF COLON: ICD-10-CM

## 2023-09-22 DIAGNOSIS — Z12.5 PROSTATE CANCER SCREENING: ICD-10-CM

## 2023-09-22 DIAGNOSIS — Z00.00 ADULT GENERAL MEDICAL EXAM: ICD-10-CM

## 2023-09-22 PROBLEM — R35.0 URINARY FREQUENCY: Status: RESOLVED | Noted: 2022-12-07 | Resolved: 2023-09-22

## 2023-09-22 PROCEDURE — 3078F DIAST BP <80 MM HG: CPT | Performed by: FAMILY MEDICINE

## 2023-09-22 PROCEDURE — 3077F SYST BP >= 140 MM HG: CPT | Performed by: FAMILY MEDICINE

## 2023-09-22 PROCEDURE — 99214 OFFICE O/P EST MOD 30 MIN: CPT | Performed by: FAMILY MEDICINE

## 2023-09-22 ASSESSMENT — FIBROSIS 4 INDEX: FIB4 SCORE: 1.833333333333333333

## 2023-09-22 NOTE — PROGRESS NOTES
Subjective:     CC: Patient is here for follow-up.  Also due for labs and CT scan.    HPI:   Rodrigo presents today with the following medical concerns:    Adult general medical exam  Patient is here for his medical exam.  States he is feeling very good.  He did just get back from a trip where he is with friends who were smoking and drinking heavily.  He did not drink any alcohol but he was smoking more than normal and has had a sore throat.  Is starting to get better.  He has lost 12 pounds since last seen by watching his diet and adding Salagen.    Dyslipidemia  This is a chronic problem.  He is on a statin.  He is on a healthy diet.    Essential hypertension, benign  This is a chronic stable condition.  Blood pressures well controlled on current medications.    History of adenomatous polyp of colon  This is a chronic problem.  He states he called his gastroenterologist and he was told he is not due for his next colonoscopy until .    Idiopathic gout, left ankle and foot  This is a chronic problem.  Patient is on  allopurinol.  He is due for uric acid level.  No recent attacks.    Lung nodule, multiple  This is a chronic problem.  Patient is ready to have his follow-up CT scan.  That will be ordered.    Thrombocytopenia (HCC)  This is a chronic stable condition.  Continue to follow.    History reviewed. No pertinent past medical history.    Social History     Tobacco Use    Smoking status: Former     Current packs/day: 0.00     Average packs/day: 0.5 packs/day for 41.5 years (20.7 ttl pk-yrs)     Types: Cigarettes     Start date: 1970     Quit date: 5/15/2012     Years since quittin.3    Smokeless tobacco: Never   Vaping Use    Vaping Use: Never used   Substance Use Topics    Alcohol use: Not Currently    Drug use: Never       Current Outpatient Medications Ordered in Epic   Medication Sig Dispense Refill    amLODIPine (NORVASC) 5 MG Tab TAKE 1 TABLET BY MOUTH EVERY  Tablet 3    albuterol 108  "(90 Base) MCG/ACT Aero Soln inhalation aerosol Inhale 1-2 Puffs every 6 hours as needed for Shortness of Breath. 1 Each 5    allopurinol (ZYLOPRIM) 100 MG Tab Take 1 Tablet by mouth 2 times a day. 180 Tablet 3    atorvastatin (LIPITOR) 20 MG Tab Take 1 Tablet by mouth every evening. 100 Tablet 3    irbesartan (AVAPRO) 150 MG Tab Take 1 Tablet by mouth 2 times a day. 200 Tablet 3    aspirin 81 MG EC tablet ASPIRIN 81 MG TABLET       No current Epic-ordered facility-administered medications on file.       Allergies:  Patient has no known allergies.    Health Maintenance: Completed    ROS:  Gen: no fevers/chills, 12 pound weight loss due to diet  Eyes: no changes in vision  ENT: no sore throat, no hearing loss, no bloody nose  Pulm: no sob, no cough  CV: no chest pain, no palpitations  GI: no nausea/vomiting, no diarrhea  : no dysuria  MSk: no myalgias  Skin: no rash  Neuro: no headaches, no numbness/tingling  Heme/Lymph: no easy bruising      Objective:       Exam:  BP (!) 142/58   Pulse (!) 54   Temp (!) 35.8 °C (96.4 °F) (Temporal)   Resp 18   Ht 1.702 m (5' 7\")   Wt 79.3 kg (174 lb 12.8 oz)   SpO2 97%   BMI 27.38 kg/m²  Body mass index is 27.38 kg/m².    Gen: Alert and oriented, No apparent distress.  Eyes:   Extraocular motions intact.  No scleral icterus seen.  Ears:    Ear canals and TMs are clear.  Neck: Neck is supple without lymphadenopathy.  Lungs: Normal effort, CTA bilaterally, no wheezes, rhonchi, or rales  CV: Regular rate and rhythm. No murmurs, rubs, or gallops.  No carotid bruits heard.  Abdomen: Soft, nontender, no organomegaly or masses.  Normal bowel sounds.  Ext: No clubbing, cyanosis, edema.  Neuro: Cranial nerves II through VIII are grossly intact.  No lateralized signs are seen.  Gait is normal.      Labs: Ordered    Assessment & Plan:     68 y.o. male with the following -     1. Lung nodule, multiple  This is a chronic problem.  CT scan ordered.  To be notified of the results.  - " CT-HIGH RISK LUNG CANCER-SCREENING; Future    2. Encounter for abdominal aortic aneurysm screening  This is a screening issue.  Ultrasound ordered.  - US-AORTA/ILIACS DUPLEX LIMITED; Future    3. Adult general medical exam  Patient's medical exam was done and was unremarkable.  Continue to follow.  Baseline medical questions addressed and need baseline labs ordered.    4. Thrombocytopenia (HCC)  This is a chronic problem.  Lab ordered.  - CBC WITH DIFFERENTIAL; Future    5. Idiopathic gout, left ankle and foot  This is a chronic stable condition.  Continue on allopurinol.  - URIC ACID; Future    6. Essential hypertension, benign  This is a chronic stable condition.  Continue on current medications.  - Comp Metabolic Panel; Future  - Lipid Profile; Future  - URINALYSIS,CULTURE IF INDICATED; Future  - CBC WITH DIFFERENTIAL; Future  - ESTIMATED GFR; Future    7. Dyslipidemia  This is a chronic problem.  Lab ordered.  Continue on his statin.  - Comp Metabolic Panel; Future  - Lipid Profile; Future    8. Prostate cancer screening  This is a screening issue.  Lab ordered.  - PROSTATE SPECIFIC AG SCREENING; Future    9. History of adenomatous polyp of colon  This is a chronic problem.  Colonoscopy due 2025 per patient history.          Return in about 6 months (around 3/22/2024) for Long.    Please note that this dictation was created using voice recognition software. I have made every reasonable attempt to correct obvious errors, but I expect that there are errors of grammar and possibly content that I did not discover before finalizing the note.

## 2023-09-22 NOTE — ASSESSMENT & PLAN NOTE
This is a chronic problem.  He states he called his gastroenterologist and he was told he is not due for his next colonoscopy until 2025.

## 2023-09-22 NOTE — ASSESSMENT & PLAN NOTE
This is a chronic problem.  Patient is ready to have his follow-up CT scan.  That will be ordered.

## 2023-09-22 NOTE — ASSESSMENT & PLAN NOTE
Patient is here for his medical exam.  States he is feeling very good.  He did just get back from a trip where he is with friends who were smoking and drinking heavily.  He did not drink any alcohol but he was smoking more than normal and has had a sore throat.  Is starting to get better.  He has lost 12 pounds since last seen by watching his diet and adding Salagen.

## 2023-09-22 NOTE — ASSESSMENT & PLAN NOTE
This is a chronic problem.  Patient is on  allopurinol.  He is due for uric acid level.  No recent attacks.

## 2023-09-25 ENCOUNTER — HOSPITAL ENCOUNTER (OUTPATIENT)
Dept: RADIOLOGY | Facility: MEDICAL CENTER | Age: 68
End: 2023-09-25
Attending: FAMILY MEDICINE
Payer: MEDICARE

## 2023-09-25 ENCOUNTER — HOSPITAL ENCOUNTER (OUTPATIENT)
Dept: LAB | Facility: MEDICAL CENTER | Age: 68
End: 2023-09-25
Attending: FAMILY MEDICINE
Payer: MEDICARE

## 2023-09-25 DIAGNOSIS — E78.5 DYSLIPIDEMIA: ICD-10-CM

## 2023-09-25 DIAGNOSIS — Z12.5 PROSTATE CANCER SCREENING: ICD-10-CM

## 2023-09-25 DIAGNOSIS — M10.072 IDIOPATHIC GOUT, LEFT ANKLE AND FOOT: ICD-10-CM

## 2023-09-25 DIAGNOSIS — D69.6 THROMBOCYTOPENIA (HCC): ICD-10-CM

## 2023-09-25 DIAGNOSIS — R91.8 LUNG NODULE, MULTIPLE: ICD-10-CM

## 2023-09-25 DIAGNOSIS — I10 ESSENTIAL HYPERTENSION, BENIGN: ICD-10-CM

## 2023-09-25 LAB
ALBUMIN SERPL BCP-MCNC: 4.7 G/DL (ref 3.2–4.9)
ALBUMIN/GLOB SERPL: 2 G/DL
ALP SERPL-CCNC: 112 U/L (ref 30–99)
ALT SERPL-CCNC: 25 U/L (ref 2–50)
ANION GAP SERPL CALC-SCNC: 8 MMOL/L (ref 7–16)
APPEARANCE UR: CLEAR
AST SERPL-CCNC: 22 U/L (ref 12–45)
BASOPHILS # BLD AUTO: 0.6 % (ref 0–1.8)
BASOPHILS # BLD: 0.03 K/UL (ref 0–0.12)
BILIRUB SERPL-MCNC: 0.8 MG/DL (ref 0.1–1.5)
BILIRUB UR QL STRIP.AUTO: NEGATIVE
BUN SERPL-MCNC: 14 MG/DL (ref 8–22)
CALCIUM ALBUM COR SERPL-MCNC: 8.9 MG/DL (ref 8.5–10.5)
CALCIUM SERPL-MCNC: 9.5 MG/DL (ref 8.5–10.5)
CHLORIDE SERPL-SCNC: 107 MMOL/L (ref 96–112)
CHOLEST SERPL-MCNC: 106 MG/DL (ref 100–199)
CO2 SERPL-SCNC: 27 MMOL/L (ref 20–33)
COLOR UR: YELLOW
CREAT SERPL-MCNC: 1.14 MG/DL (ref 0.5–1.4)
EOSINOPHIL # BLD AUTO: 0.18 K/UL (ref 0–0.51)
EOSINOPHIL NFR BLD: 3.4 % (ref 0–6.9)
ERYTHROCYTE [DISTWIDTH] IN BLOOD BY AUTOMATED COUNT: 38.6 FL (ref 35.9–50)
FASTING STATUS PATIENT QL REPORTED: NORMAL
GFR SERPLBLD CREATININE-BSD FMLA CKD-EPI: 71 ML/MIN/1.73 M 2
GLOBULIN SER CALC-MCNC: 2.3 G/DL (ref 1.9–3.5)
GLUCOSE SERPL-MCNC: 95 MG/DL (ref 65–99)
GLUCOSE UR STRIP.AUTO-MCNC: NEGATIVE MG/DL
HCT VFR BLD AUTO: 44.1 % (ref 42–52)
HDLC SERPL-MCNC: 30 MG/DL
HGB BLD-MCNC: 15 G/DL (ref 14–18)
IMM GRANULOCYTES # BLD AUTO: 0.02 K/UL (ref 0–0.11)
IMM GRANULOCYTES NFR BLD AUTO: 0.4 % (ref 0–0.9)
KETONES UR STRIP.AUTO-MCNC: NEGATIVE MG/DL
LDLC SERPL CALC-MCNC: 48 MG/DL
LEUKOCYTE ESTERASE UR QL STRIP.AUTO: NEGATIVE
LYMPHOCYTES # BLD AUTO: 1.27 K/UL (ref 1–4.8)
LYMPHOCYTES NFR BLD: 24.3 % (ref 22–41)
MCH RBC QN AUTO: 30 PG (ref 27–33)
MCHC RBC AUTO-ENTMCNC: 34 G/DL (ref 32.3–36.5)
MCV RBC AUTO: 88.2 FL (ref 81.4–97.8)
MICRO URNS: NORMAL
MONOCYTES # BLD AUTO: 0.51 K/UL (ref 0–0.85)
MONOCYTES NFR BLD AUTO: 9.8 % (ref 0–13.4)
NEUTROPHILS # BLD AUTO: 3.22 K/UL (ref 1.82–7.42)
NEUTROPHILS NFR BLD: 61.5 % (ref 44–72)
NITRITE UR QL STRIP.AUTO: NEGATIVE
NRBC # BLD AUTO: 0 K/UL
NRBC BLD-RTO: 0 /100 WBC (ref 0–0.2)
PH UR STRIP.AUTO: 6 [PH] (ref 5–8)
PLATELET # BLD AUTO: 148 K/UL (ref 164–446)
PMV BLD AUTO: 9.8 FL (ref 9–12.9)
POTASSIUM SERPL-SCNC: 4.5 MMOL/L (ref 3.6–5.5)
PROT SERPL-MCNC: 7 G/DL (ref 6–8.2)
PROT UR QL STRIP: NEGATIVE MG/DL
PSA SERPL-MCNC: 3.05 NG/ML (ref 0–4)
RBC # BLD AUTO: 5 M/UL (ref 4.7–6.1)
RBC UR QL AUTO: NEGATIVE
SODIUM SERPL-SCNC: 142 MMOL/L (ref 135–145)
SP GR UR STRIP.AUTO: 1.02
TRIGL SERPL-MCNC: 141 MG/DL (ref 0–149)
URATE SERPL-MCNC: 4.7 MG/DL (ref 2.5–8.3)
UROBILINOGEN UR STRIP.AUTO-MCNC: 0.2 MG/DL
WBC # BLD AUTO: 5.2 K/UL (ref 4.8–10.8)

## 2023-09-25 PROCEDURE — 84153 ASSAY OF PSA TOTAL: CPT

## 2023-09-25 PROCEDURE — 85025 COMPLETE CBC W/AUTO DIFF WBC: CPT

## 2023-09-25 PROCEDURE — 80053 COMPREHEN METABOLIC PANEL: CPT

## 2023-09-25 PROCEDURE — 71271 CT THORAX LUNG CANCER SCR C-: CPT

## 2023-09-25 PROCEDURE — 80061 LIPID PANEL: CPT

## 2023-09-25 PROCEDURE — 81003 URINALYSIS AUTO W/O SCOPE: CPT

## 2023-09-25 PROCEDURE — 84550 ASSAY OF BLOOD/URIC ACID: CPT

## 2023-09-25 PROCEDURE — 36415 COLL VENOUS BLD VENIPUNCTURE: CPT

## 2023-10-03 ENCOUNTER — TELEPHONE (OUTPATIENT)
Dept: MEDICAL GROUP | Facility: PHYSICIAN GROUP | Age: 68
End: 2023-10-03
Payer: MEDICARE

## 2023-10-04 NOTE — TELEPHONE ENCOUNTER
----- Message from Wayne Almazan III, M.D. sent at 9/26/2023  8:10 AM PDT -----  Please tell patient that his labs looked good although 1 test called alkaline phosphatase is slightly above normal.  Sometimes this happens just with arthritis and we will continue to follow it.  His platelet count is still slightly below normal but similar to what he has had in the past.  He can recheck labs in 1 year.

## 2023-10-04 NOTE — TELEPHONE ENCOUNTER
Phone Number Called:  [phone]      Call outcome:   Pt was notified of results on 10/3/2023  5:16 PM. Pt verbalized understanding.

## 2023-10-04 NOTE — TELEPHONE ENCOUNTER
----- Message from Wayne Almazan III, M.D. sent at 9/27/2023  8:28 AM PDT -----  Please let patient know that his CT of the lungs showed no changes in his nodules.  We can recheck it again in 1 year.  He does have a slightly enlarged spleen but since his lab test are normal and he is not having symptoms we do not need to do anything with that.  He also has some calcifications to the arteries going to his heart.  Again as long as he is not having any symptoms we do not need to do anything but if anything changes have him come in to see me again.  Dr. Almazan

## 2023-10-19 RX ORDER — ALLOPURINOL 100 MG/1
100 TABLET ORAL 2 TIMES DAILY
Qty: 180 TABLET | Refills: 3 | Status: SHIPPED | OUTPATIENT
Start: 2023-10-19

## 2023-11-13 RX ORDER — IRBESARTAN 150 MG/1
150 TABLET ORAL 2 TIMES DAILY
Qty: 200 TABLET | Refills: 2 | Status: SHIPPED | OUTPATIENT
Start: 2023-11-13

## 2023-12-14 NOTE — TELEPHONE ENCOUNTER
Received request via: Patient    Was the patient seen in the last year in this department? Yes    Does the patient have an active prescription (recently filled or refills available) for medication(s) requested? No    
DISPLAY PLAN FREE TEXT

## 2023-12-20 ENCOUNTER — HOSPITAL ENCOUNTER (OUTPATIENT)
Dept: RADIOLOGY | Facility: MEDICAL CENTER | Age: 68
End: 2023-12-20
Attending: FAMILY MEDICINE
Payer: MEDICARE

## 2023-12-20 DIAGNOSIS — Z13.6 ENCOUNTER FOR ABDOMINAL AORTIC ANEURYSM SCREENING: ICD-10-CM

## 2023-12-20 PROCEDURE — 93979 VASCULAR STUDY: CPT

## 2024-01-04 RX ORDER — ATORVASTATIN CALCIUM 20 MG/1
20 TABLET, FILM COATED ORAL NIGHTLY
Qty: 100 TABLET | Refills: 2 | Status: SHIPPED | OUTPATIENT
Start: 2024-01-04

## 2024-03-22 ENCOUNTER — OFFICE VISIT (OUTPATIENT)
Dept: MEDICAL GROUP | Facility: PHYSICIAN GROUP | Age: 69
End: 2024-03-22
Payer: MEDICARE

## 2024-03-22 VITALS
TEMPERATURE: 97.8 F | RESPIRATION RATE: 18 BRPM | OXYGEN SATURATION: 97 % | WEIGHT: 183 LBS | DIASTOLIC BLOOD PRESSURE: 74 MMHG | SYSTOLIC BLOOD PRESSURE: 120 MMHG | HEART RATE: 58 BPM | BODY MASS INDEX: 28.72 KG/M2 | HEIGHT: 67 IN

## 2024-03-22 DIAGNOSIS — I10 ESSENTIAL HYPERTENSION, BENIGN: ICD-10-CM

## 2024-03-22 DIAGNOSIS — R91.8 LUNG NODULE, MULTIPLE: ICD-10-CM

## 2024-03-22 DIAGNOSIS — R16.1 SPLENOMEGALY: ICD-10-CM

## 2024-03-22 DIAGNOSIS — D69.6 THROMBOCYTOPENIA (HCC): ICD-10-CM

## 2024-03-22 DIAGNOSIS — I25.10 CORONARY ARTERY CALCIFICATION: ICD-10-CM

## 2024-03-22 DIAGNOSIS — I70.0 ATHEROSCLEROSIS OF AORTA (HCC): ICD-10-CM

## 2024-03-22 DIAGNOSIS — I25.84 CORONARY ARTERY CALCIFICATION: ICD-10-CM

## 2024-03-22 PROCEDURE — 99214 OFFICE O/P EST MOD 30 MIN: CPT | Performed by: FAMILY MEDICINE

## 2024-03-22 PROCEDURE — 3078F DIAST BP <80 MM HG: CPT | Performed by: FAMILY MEDICINE

## 2024-03-22 PROCEDURE — 3074F SYST BP LT 130 MM HG: CPT | Performed by: FAMILY MEDICINE

## 2024-03-22 ASSESSMENT — FIBROSIS 4 INDEX: FIB4 SCORE: 1.99

## 2024-03-22 ASSESSMENT — PATIENT HEALTH QUESTIONNAIRE - PHQ9: CLINICAL INTERPRETATION OF PHQ2 SCORE: 0

## 2024-03-22 NOTE — PROGRESS NOTES
Subjective:     CC: Here for 6-month follow-up.  States he is feeling very well.    HPI:   Rodrigo presents today with the following medical concerns:    Essential hypertension, benign  This is a chronic condition.  Here for follow-up.  His blood pressures have been doing very good at home and is normal on today's exam.    Thrombocytopenia (HCC)  This is a chronic problem.  Noted on previous lab studies.  Will continue to follow.    Splenomegaly  This is a chronic problem.  Noted on previous exam studies.  No current symptoms.    Lung nodule, multiple  This is a chronic problem.  CT scan was done last September and showed no change in his nodules.  Will continue to check it annually.    Coronary artery calcification  This is likely a chronic problem.  Noted on previous lung CTs.  No current symptoms.    Atherosclerosis of aorta (HCC)  This is a chronic problem.  Noted on previous lung CTs.    History reviewed. No pertinent past medical history.    Social History     Tobacco Use    Smoking status: Former     Current packs/day: 0.00     Average packs/day: 0.5 packs/day for 41.5 years (20.7 ttl pk-yrs)     Types: Cigarettes     Start date: 1970     Quit date: 5/15/2012     Years since quittin.8    Smokeless tobacco: Never   Vaping Use    Vaping Use: Never used   Substance Use Topics    Alcohol use: Not Currently    Drug use: Never       Current Outpatient Medications Ordered in Epic   Medication Sig Dispense Refill    atorvastatin (LIPITOR) 20 MG Tab Take 1 Tablet by mouth every evening. 100 Tablet 2    irbesartan (AVAPRO) 150 MG Tab TAKE 1 TABLET BY MOUTH TWICE A  Tablet 2    allopurinol (ZYLOPRIM) 100 MG Tab TAKE 1 TABLET BY MOUTH TWICE A  Tablet 3    amLODIPine (NORVASC) 5 MG Tab TAKE 1 TABLET BY MOUTH EVERY  Tablet 3    albuterol 108 (90 Base) MCG/ACT Aero Soln inhalation aerosol Inhale 1-2 Puffs every 6 hours as needed for Shortness of Breath. 1 Each 5    aspirin 81 MG EC tablet  "ASPIRIN 81 MG TABLET       No current Epic-ordered facility-administered medications on file.       Allergies:  Patient has no known allergies.    Health Maintenance: Completed    ROS:  Gen: no fevers/chills, no changes in weight  Eyes: no changes in vision  ENT: no sore throat, no hearing loss, no bloody nose  Pulm: no sob, no cough  CV: no chest pain, no palpitations  GI: no nausea/vomiting, no diarrhea  : no dysuria  MSk: no myalgias  Skin: no rash  Neuro: no headaches, no numbness/tingling  Heme/Lymph: no easy bruising      Objective:       Exam:  /74 (BP Location: Left arm, Patient Position: Sitting, BP Cuff Size: Adult)   Pulse (!) 58   Temp 36.6 °C (97.8 °F) (Temporal)   Resp 18   Ht 1.702 m (5' 7\")   Wt 83 kg (183 lb)   SpO2 97%   BMI 28.66 kg/m²  Body mass index is 28.66 kg/m².    Gen: Alert and oriented, No apparent distress.  Neck: Neck is supple without lymphadenopathy.  Lungs: Normal effort, CTA bilaterally, no wheezes, rhonchi, or rales  CV: Regular rate and rhythm. No murmurs, rubs, or gallops.  Ext: No clubbing, cyanosis, edema.      Labs: Reviewed and will be redone this September    Assessment & Plan:     69 y.o. male with the following -     1. Thrombocytopenia (HCC)  This is a chronic problem.  Continue to follow.    2. Essential hypertension, benign  This is a chronic stable condition.  Continue current medications.    3. Atherosclerosis of aorta (HCC)  This is a chronic problem.  Continue on healthy diet.    4. Coronary artery calcification  This is a chronic problem.  No symptoms.  Continue to follow.    5. Lung nodule, multiple  This is a chronic problem.  Repeat chest CT in September.    6. Splenomegaly  This is a chronic condition.  Continue to follow.    HCC Gap Form    Diagnosis to address: D69.6 - Thrombocytopenia (HCC)  Assessment and plan: Chronic, stable. Continue with current defined treatment plan: stable Condition.. Follow-up at least annually.  Last edited 03/22/24 " 10:17 PDT by Wayne Almazan III, M.D.         Return in about 6 months (around 9/22/2024) for Long.    Please note that this dictation was created using voice recognition software. I have made every reasonable attempt to correct obvious errors, but I expect that there are errors of grammar and possibly content that I did not discover before finalizing the note.

## 2024-03-22 NOTE — ASSESSMENT & PLAN NOTE
This is a chronic problem.  CT scan was done last September and showed no change in his nodules.  Will continue to check it annually.

## 2024-03-22 NOTE — ASSESSMENT & PLAN NOTE
This is a chronic condition.  Here for follow-up.  His blood pressures have been doing very good at home and is normal on today's exam.

## 2024-04-18 ENCOUNTER — TELEPHONE (OUTPATIENT)
Dept: HEALTH INFORMATION MANAGEMENT | Facility: OTHER | Age: 69
End: 2024-04-18
Payer: MEDICARE

## 2024-05-14 ENCOUNTER — OFFICE VISIT (OUTPATIENT)
Dept: URGENT CARE | Facility: PHYSICIAN GROUP | Age: 69
End: 2024-05-14
Payer: MEDICARE

## 2024-05-14 VITALS
TEMPERATURE: 99.8 F | OXYGEN SATURATION: 97 % | SYSTOLIC BLOOD PRESSURE: 128 MMHG | WEIGHT: 180 LBS | HEART RATE: 68 BPM | RESPIRATION RATE: 16 BRPM | HEIGHT: 67 IN | DIASTOLIC BLOOD PRESSURE: 50 MMHG | BODY MASS INDEX: 28.25 KG/M2

## 2024-05-14 DIAGNOSIS — H66.001 NON-RECURRENT ACUTE SUPPURATIVE OTITIS MEDIA OF RIGHT EAR WITHOUT SPONTANEOUS RUPTURE OF TYMPANIC MEMBRANE: ICD-10-CM

## 2024-05-14 DIAGNOSIS — U07.1 COVID-19: ICD-10-CM

## 2024-05-14 DIAGNOSIS — J06.9 ACUTE UPPER RESPIRATORY INFECTION, UNSPECIFIED: ICD-10-CM

## 2024-05-14 LAB
FLUAV RNA SPEC QL NAA+PROBE: NEGATIVE
FLUBV RNA SPEC QL NAA+PROBE: NEGATIVE
RSV RNA SPEC QL NAA+PROBE: NEGATIVE
SARS-COV-2 RNA RESP QL NAA+PROBE: POSITIVE

## 2024-05-14 PROCEDURE — 3078F DIAST BP <80 MM HG: CPT | Performed by: FAMILY MEDICINE

## 2024-05-14 PROCEDURE — 3074F SYST BP LT 130 MM HG: CPT | Performed by: FAMILY MEDICINE

## 2024-05-14 PROCEDURE — 0241U POCT CEPHEID COV-2, FLU A/B, RSV - PCR: CPT | Performed by: FAMILY MEDICINE

## 2024-05-14 PROCEDURE — 99213 OFFICE O/P EST LOW 20 MIN: CPT | Performed by: FAMILY MEDICINE

## 2024-05-14 RX ORDER — AMOXICILLIN 875 MG/1
875 TABLET, COATED ORAL 2 TIMES DAILY
Qty: 14 TABLET | Refills: 0 | Status: SHIPPED | OUTPATIENT
Start: 2024-05-14 | End: 2024-05-21

## 2024-05-14 ASSESSMENT — ENCOUNTER SYMPTOMS
FEVER: 1
CHILLS: 1
MYALGIAS: 1

## 2024-05-14 ASSESSMENT — FIBROSIS 4 INDEX: FIB4 SCORE: 1.99

## 2024-05-14 NOTE — PROGRESS NOTES
"Subjective:     Rodrigo Nunez Jr. is a 69 y.o. male who presents for Fever (Symptoms started yesterday. \"Made turkey sandwhich, than felt really bad fever like symptoms\"-pt states, vertigo when bending over. Body aches, food is not sitting well in body. )    HPI  Pt presents for evaluation of an acute problem  Pt with an acute illness which started yesterday   Felt very fatigued and with myalgias  Started having a fever  No headache, ear pain, nasal congestion  Has had a mild sore throat and mild cough  States that his right ear has been plugged for a while, but not painful  No sick contacts with similar symptoms    Has also had some intermittent \"vertigo\" when bending over for the past week  The dizziness is not present currently, and has been intermittent    Review of Systems   Constitutional:  Positive for chills, fever and malaise/fatigue.   Musculoskeletal:  Positive for myalgias.       PMH:  has no past medical history on file.  MEDS:   Current Outpatient Medications:     amoxicillin (AMOXIL) 875 MG tablet, Take 1 Tablet by mouth 2 times a day for 7 days., Disp: 14 Tablet, Rfl: 0    molnupiravir 200 MG capsule, Take 4 Capsules by mouth 2 times a day for 5 days., Disp: 40 Capsule, Rfl: 0    atorvastatin (LIPITOR) 20 MG Tab, Take 1 Tablet by mouth every evening., Disp: 100 Tablet, Rfl: 2    irbesartan (AVAPRO) 150 MG Tab, TAKE 1 TABLET BY MOUTH TWICE A DAY, Disp: 200 Tablet, Rfl: 2    allopurinol (ZYLOPRIM) 100 MG Tab, TAKE 1 TABLET BY MOUTH TWICE A DAY, Disp: 180 Tablet, Rfl: 3    amLODIPine (NORVASC) 5 MG Tab, TAKE 1 TABLET BY MOUTH EVERY DAY, Disp: 100 Tablet, Rfl: 3    albuterol 108 (90 Base) MCG/ACT Aero Soln inhalation aerosol, Inhale 1-2 Puffs every 6 hours as needed for Shortness of Breath., Disp: 1 Each, Rfl: 5    aspirin 81 MG EC tablet, ASPIRIN 81 MG TABLET, Disp: , Rfl:   ALLERGIES: No Known Allergies  SURGHX:   Past Surgical History:   Procedure Laterality Date    CATARACT EXTRACTION WITH " "IOL      RETINAL DETACHMENT REPAIR       SOCHX:  reports that he quit smoking about 12 years ago. His smoking use included cigarettes. He started smoking about 53 years ago. He has a 20.7 pack-year smoking history. He has never used smokeless tobacco. He reports that he does not currently use alcohol. He reports that he does not use drugs.     Objective:   /50 (BP Location: Left arm, Patient Position: Sitting, BP Cuff Size: Adult)   Pulse 68   Temp 37.7 °C (99.8 °F) (Temporal)   Resp 16   Ht 1.702 m (5' 7\")   Wt 81.6 kg (180 lb)   SpO2 97%   BMI 28.19 kg/m²     Physical Exam  Constitutional:       General: He is not in acute distress.     Appearance: He is well-developed. He is not diaphoretic.   HENT:      Head: Normocephalic and atraumatic.      Right Ear: Ear canal and external ear normal.      Left Ear: Tympanic membrane, ear canal and external ear normal.      Ears:      Comments: Right tympanic membrane nonerythematous with moderate purulent effusion, no perforation appreciated     Nose: Nose normal.      Mouth/Throat:      Mouth: Mucous membranes are moist.      Pharynx: Oropharynx is clear. No oropharyngeal exudate or posterior oropharyngeal erythema.   Neck:      Trachea: No tracheal deviation.   Cardiovascular:      Rate and Rhythm: Normal rate and regular rhythm.      Heart sounds: Normal heart sounds.   Pulmonary:      Effort: Pulmonary effort is normal. No respiratory distress.      Breath sounds: Normal breath sounds. No wheezing or rales.   Abdominal:      General: Abdomen is flat. Bowel sounds are normal. There is no distension.      Palpations: Abdomen is soft.      Tenderness: There is no abdominal tenderness.   Musculoskeletal:         General: Normal range of motion.      Cervical back: Normal range of motion and neck supple. No tenderness.   Lymphadenopathy:      Cervical: No cervical adenopathy.   Skin:     General: Skin is warm and dry.      Findings: No rash.   Neurological:      " Mental Status: He is alert.   Psychiatric:         Behavior: Behavior normal.         Thought Content: Thought content normal.         Judgment: Judgment normal.       Assessment/Plan:   Assessment    1. Non-recurrent acute suppurative otitis media of right ear without spontaneous rupture of tympanic membrane  - amoxicillin (AMOXIL) 875 MG tablet; Take 1 Tablet by mouth 2 times a day for 7 days.  Dispense: 14 Tablet; Refill: 0    2. COVID-19  - POCT CEPHEID COV-2, FLU A/B, RSV - PCR  - molnupiravir 200 MG capsule; Take 4 Capsules by mouth 2 times a day for 5 days.  Dispense: 40 Capsule; Refill: 0    3. Acute upper respiratory infection, unspecified  - POCT CEPHEID COV-2, FLU A/B, RSV - PCR  - molnupiravir 200 MG capsule; Take 4 Capsules by mouth 2 times a day for 5 days.  Dispense: 40 Capsule; Refill: 0    Patient with COVID-19 infection and right otitis media.  Suspect that the otitis media is contributing quite a bit to his intermittent vertigo he has been having.  Fever just started yesterday and he appears to be inside the treatment window for COVID-19.  Due to his age and other comorbidities, he would be high risk for severe COVID infection and recommended treatment with molnupiravir.  All questions answered and will follow-up in the urgent care as needed.

## 2024-05-21 ENCOUNTER — OFFICE VISIT (OUTPATIENT)
Dept: MEDICAL GROUP | Facility: PHYSICIAN GROUP | Age: 69
End: 2024-05-21
Payer: MEDICARE

## 2024-05-21 VITALS
HEIGHT: 68 IN | RESPIRATION RATE: 16 BRPM | HEART RATE: 52 BPM | OXYGEN SATURATION: 99 % | TEMPERATURE: 97.5 F | BODY MASS INDEX: 27.57 KG/M2 | SYSTOLIC BLOOD PRESSURE: 146 MMHG | WEIGHT: 181.9 LBS | DIASTOLIC BLOOD PRESSURE: 66 MMHG

## 2024-05-21 DIAGNOSIS — R42 DIZZINESS: ICD-10-CM

## 2024-05-21 DIAGNOSIS — H69.93 DYSFUNCTION OF BOTH EUSTACHIAN TUBES: ICD-10-CM

## 2024-05-21 PROCEDURE — 3078F DIAST BP <80 MM HG: CPT | Performed by: PHYSICIAN ASSISTANT

## 2024-05-21 PROCEDURE — 99213 OFFICE O/P EST LOW 20 MIN: CPT | Performed by: PHYSICIAN ASSISTANT

## 2024-05-21 PROCEDURE — 3077F SYST BP >= 140 MM HG: CPT | Performed by: PHYSICIAN ASSISTANT

## 2024-05-21 ASSESSMENT — FIBROSIS 4 INDEX: FIB4 SCORE: 1.99

## 2024-05-21 ASSESSMENT — ENCOUNTER SYMPTOMS
FEVER: 0
SHORTNESS OF BREATH: 0
DIZZINESS: 1
CHILLS: 0

## 2024-05-21 NOTE — PROGRESS NOTES
"SUBJECTIVE:     CC: UC follow up     HPI:   Rodrigo, patient of Dr. Wayne Almazan, presents today with the following:          ASSESSMENT & PLAN by Problem:       Problem List Items Addressed This Visit    None  Visit Diagnoses       Dysfunction of both eustachian tubes        Acute, uncontrolled. Discussed fluticasone nasal spray and chlorpheniramine tablets. Follow up if symptoms worsen.    Dizziness        Acute, intermittent. Suspect exacerbation from eustachian tube dysfunction. Follow up if OTC meds don't help.            Return if symptoms worsen or fail to improve.        HPI:       Overall feeling better but still having some dizziness  Gets a quick spin for about a second if he bends over  No fever x 3 days  Denies cough/SOB  OTC medications: none  Finished amoxicillin.             ROS:  Review of Systems   Constitutional:  Negative for chills and fever.   Respiratory:  Negative for shortness of breath.    Cardiovascular:  Negative for chest pain.   Neurological:  Positive for dizziness.       OBJECTIVE:     Exam:  BP (!) 146/66 (BP Location: Left arm, Patient Position: Sitting, BP Cuff Size: Adult)   Pulse (!) 52   Temp 36.4 °C (97.5 °F) (Temporal)   Resp 16   Ht 1.727 m (5' 8\")   Wt 82.5 kg (181 lb 14.4 oz)   SpO2 99%   BMI 27.66 kg/m²  Body mass index is 27.66 kg/m².    Physical Exam  Vitals reviewed.   Constitutional:       General: He is not in acute distress.     Appearance: Normal appearance.   HENT:      Ears:      Comments: Bilateral TMs are bulging significantly with serous effusions. No signs of infection noted.  Bilateral EACs are clear.  Cardiovascular:      Rate and Rhythm: Normal rate.   Pulmonary:      Effort: Pulmonary effort is normal. No respiratory distress.      Breath sounds: No wheezing.   Neurological:      General: No focal deficit present.      Mental Status: He is alert.   Psychiatric:         Mood and Affect: Mood normal.         Behavior: Behavior normal.         Judgment: " Judgment normal.           CHART REVIEW:                                 Please note that this dictation was created using voice recognition software. I have made every reasonable attempt to correct obvious errors, but I expect that there are errors of grammar and possibly content that I did not discover before finalizing the note.

## 2024-06-05 RX ORDER — AMLODIPINE BESYLATE 5 MG/1
5 TABLET ORAL DAILY
Qty: 100 TABLET | Refills: 3 | Status: SHIPPED | OUTPATIENT
Start: 2024-06-05

## 2024-06-05 NOTE — TELEPHONE ENCOUNTER
Received request via: Pharmacy    Was the patient seen in the last year in this department? Yes    Does the patient have an active prescription (recently filled or refills available) for medication(s) requested? No    Pharmacy Name: [facility]     Does the patient have [facility] Plus and need 100 day supply (blood pressure, diabetes and cholesterol meds only)? Yes, quantity updated to 100 days

## 2024-06-14 ENCOUNTER — HOSPITAL ENCOUNTER (OUTPATIENT)
Dept: LAB | Facility: MEDICAL CENTER | Age: 69
End: 2024-06-14
Payer: MEDICARE

## 2024-06-14 LAB
ALBUMIN SERPL BCP-MCNC: 4.6 G/DL (ref 3.2–4.9)
ALBUMIN/GLOB SERPL: 1.6 G/DL
ALP SERPL-CCNC: 124 U/L (ref 30–99)
ALT SERPL-CCNC: 27 U/L (ref 2–50)
ANION GAP SERPL CALC-SCNC: 12 MMOL/L (ref 7–16)
AST SERPL-CCNC: 21 U/L (ref 12–45)
BASOPHILS # BLD AUTO: 0.4 % (ref 0–1.8)
BASOPHILS # BLD: 0.02 K/UL (ref 0–0.12)
BILIRUB SERPL-MCNC: 0.6 MG/DL (ref 0.1–1.5)
BUN SERPL-MCNC: 16 MG/DL (ref 8–22)
CALCIUM ALBUM COR SERPL-MCNC: 9.3 MG/DL (ref 8.5–10.5)
CALCIUM SERPL-MCNC: 9.8 MG/DL (ref 8.5–10.5)
CHLORIDE SERPL-SCNC: 106 MMOL/L (ref 96–112)
CO2 SERPL-SCNC: 23 MMOL/L (ref 20–33)
CREAT SERPL-MCNC: 0.94 MG/DL (ref 0.5–1.4)
EOSINOPHIL # BLD AUTO: 0.15 K/UL (ref 0–0.51)
EOSINOPHIL NFR BLD: 3.1 % (ref 0–6.9)
ERYTHROCYTE [DISTWIDTH] IN BLOOD BY AUTOMATED COUNT: 39.3 FL (ref 35.9–50)
GFR SERPLBLD CREATININE-BSD FMLA CKD-EPI: 89 ML/MIN/1.73 M 2
GLOBULIN SER CALC-MCNC: 2.8 G/DL (ref 1.9–3.5)
GLUCOSE SERPL-MCNC: 98 MG/DL (ref 65–99)
HCT VFR BLD AUTO: 45 % (ref 42–52)
HCV AB SER QL: REACTIVE
HGB BLD-MCNC: 15.7 G/DL (ref 14–18)
IMM GRANULOCYTES # BLD AUTO: 0.02 K/UL (ref 0–0.11)
IMM GRANULOCYTES NFR BLD AUTO: 0.4 % (ref 0–0.9)
LYMPHOCYTES # BLD AUTO: 1.28 K/UL (ref 1–4.8)
LYMPHOCYTES NFR BLD: 26.1 % (ref 22–41)
MCH RBC QN AUTO: 30.3 PG (ref 27–33)
MCHC RBC AUTO-ENTMCNC: 34.9 G/DL (ref 32.3–36.5)
MCV RBC AUTO: 86.9 FL (ref 81.4–97.8)
MONOCYTES # BLD AUTO: 0.58 K/UL (ref 0–0.85)
MONOCYTES NFR BLD AUTO: 11.8 % (ref 0–13.4)
NEUTROPHILS # BLD AUTO: 2.85 K/UL (ref 1.82–7.42)
NEUTROPHILS NFR BLD: 58.2 % (ref 44–72)
NRBC # BLD AUTO: 0 K/UL
NRBC BLD-RTO: 0 /100 WBC (ref 0–0.2)
PLATELET # BLD AUTO: 150 K/UL (ref 164–446)
PMV BLD AUTO: 10.2 FL (ref 9–12.9)
POTASSIUM SERPL-SCNC: 4.3 MMOL/L (ref 3.6–5.5)
PROT SERPL-MCNC: 7.4 G/DL (ref 6–8.2)
RBC # BLD AUTO: 5.18 M/UL (ref 4.7–6.1)
SODIUM SERPL-SCNC: 141 MMOL/L (ref 135–145)
WBC # BLD AUTO: 4.9 K/UL (ref 4.8–10.8)

## 2024-06-14 PROCEDURE — 87522 HEPATITIS C REVRS TRNSCRPJ: CPT

## 2024-06-14 PROCEDURE — 86803 HEPATITIS C AB TEST: CPT

## 2024-06-14 PROCEDURE — 80053 COMPREHEN METABOLIC PANEL: CPT

## 2024-06-14 PROCEDURE — 36415 COLL VENOUS BLD VENIPUNCTURE: CPT

## 2024-06-14 PROCEDURE — 85025 COMPLETE CBC W/AUTO DIFF WBC: CPT

## 2024-06-17 LAB
HCV RNA SERPL NAA+PROBE-ACNC: NOT DETECTED IU/ML
HCV RNA SERPL NAA+PROBE-LOG IU: NOT DETECTED LOG IU/ML
HCV RNA SERPL QL NAA+PROBE: NOT DETECTED

## 2024-06-18 ENCOUNTER — OFFICE VISIT (OUTPATIENT)
Dept: MEDICAL GROUP | Facility: PHYSICIAN GROUP | Age: 69
End: 2024-06-18
Payer: MEDICARE

## 2024-06-18 VITALS
HEIGHT: 67 IN | HEART RATE: 48 BPM | SYSTOLIC BLOOD PRESSURE: 144 MMHG | RESPIRATION RATE: 16 BRPM | TEMPERATURE: 97.3 F | OXYGEN SATURATION: 98 % | WEIGHT: 179.1 LBS | DIASTOLIC BLOOD PRESSURE: 60 MMHG | BODY MASS INDEX: 28.11 KG/M2

## 2024-06-18 DIAGNOSIS — Z86.19 HISTORY OF HEPATITIS C: ICD-10-CM

## 2024-06-18 DIAGNOSIS — I10 ESSENTIAL HYPERTENSION, BENIGN: ICD-10-CM

## 2024-06-18 DIAGNOSIS — H81.13 BENIGN PAROXYSMAL POSITIONAL VERTIGO DUE TO BILATERAL VESTIBULAR DISORDER: ICD-10-CM

## 2024-06-18 PROBLEM — Z00.00 ADULT GENERAL MEDICAL EXAM: Status: RESOLVED | Noted: 2023-09-22 | Resolved: 2024-06-18

## 2024-06-18 PROCEDURE — 3077F SYST BP >= 140 MM HG: CPT | Performed by: FAMILY MEDICINE

## 2024-06-18 PROCEDURE — 99213 OFFICE O/P EST LOW 20 MIN: CPT | Performed by: FAMILY MEDICINE

## 2024-06-18 PROCEDURE — 3078F DIAST BP <80 MM HG: CPT | Performed by: FAMILY MEDICINE

## 2024-06-18 ASSESSMENT — FIBROSIS 4 INDEX: FIB4 SCORE: 1.81

## 2024-06-18 NOTE — ASSESSMENT & PLAN NOTE
This is a chronic problem.  He does follow-up with digestive health.  He has hepatitis C screening test is positive but the quantitative levels are all negative.

## 2024-06-18 NOTE — ASSESSMENT & PLAN NOTE
This is a chronic problem.  Patient states for last several years has had intermittent episodes of mild vertigo that would last a couple of hours to a day and then resolved.  Couple months ago he had a COVID infection and states he has had a few more episodes than normal.  He had been seen in urgent care and here in the clinic who diagnosed him with serous otitis media.  He was treated for that and he thinks he is back to his baseline.  He did find out that his sister suffers from same thing and she states it runs in the family.  He does not hydrate very well and we talked about that as well.

## 2024-06-18 NOTE — PROGRESS NOTES
Subjective:     CC: Here for couple of issues.    HPI:   Rodrigo presents today with the following medical concerns:    Benign paroxysmal positional vertigo due to bilateral vestibular disorder  This is a chronic problem.  Patient states for last several years has had intermittent episodes of mild vertigo that would last a couple of hours to a day and then resolved.  Couple months ago he had a COVID infection and states he has had a few more episodes than normal.  He had been seen in urgent care and here in the clinic who diagnosed him with serous otitis media.  He was treated for that and he thinks he is back to his baseline.  He did find out that his sister suffers from same thing and she states it runs in the family.  He does not hydrate very well and we talked about that as well.    Essential hypertension, benign  This is a chronic problem.  His systolic pressure is slightly above his normal today.  Will continue to monitor that.    History of hepatitis C  This is a chronic problem.  He does follow-up with [facility].  He has hepatitis C screening test is positive but the quantitative levels are all negative.    History reviewed. No pertinent past medical history.    Social History     Tobacco Use    Smoking status: Former     Current packs/day: 0.00     Average packs/day: 0.5 packs/day for 41.5 years (20.7 ttl pk-yrs)     Types: Cigarettes     Start date: 1970     Quit date: 5/15/2012     Years since quittin.1    Smokeless tobacco: Never   Vaping Use    Vaping status: Never Used   Substance Use Topics    Alcohol use: Not Currently    Drug use: Never       Current Outpatient Medications Ordered in Epic   Medication Sig Dispense Refill    amLODIPine (NORVASC) 5 MG Tab TAKE 1 TABLET BY MOUTH EVERY  Tablet 3    atorvastatin (LIPITOR) 20 MG Tab Take 1 Tablet by mouth every evening. 100 Tablet 2    irbesartan (AVAPRO) 150 MG Tab TAKE 1 TABLET BY MOUTH TWICE A  Tablet 2    allopurinol  "(ZYLOPRIM) 100 MG Tab TAKE 1 TABLET BY MOUTH TWICE A  Tablet 3    albuterol 108 (90 Base) MCG/ACT Aero Soln inhalation aerosol Inhale 1-2 Puffs every 6 hours as needed for Shortness of Breath. 1 Each 5    aspirin 81 MG EC tablet ASPIRIN 81 MG TABLET       No current Epic-ordered facility-administered medications on file.       Allergies:  Patient has no known allergies.    Health Maintenance: Completed    ROS:  Gen: no fevers/chills, no changes in weight  Eyes: no changes in vision  ENT: no sore throat, no hearing loss, no bloody nose  Pulm: no sob, no cough  CV: no chest pain, no palpitations  GI: no nausea/vomiting, no diarrhea  : no dysuria  MSk: no myalgias  Skin: no rash  Neuro: no headaches, no numbness/tingling  Heme/Lymph: no easy bruising      Objective:       Exam:  BP (!) 144/60 (BP Location: Left arm, Patient Position: Sitting, BP Cuff Size: Adult)   Pulse (!) 48   Temp 36.3 °C (97.3 °F) (Temporal)   Resp 16   Ht 1.702 m (5' 7\")   Wt 81.2 kg (179 lb 1.6 oz)   SpO2 98%   BMI 28.05 kg/m²  Body mass index is 28.05 kg/m².    Gen: Alert and oriented, No apparent distress.  Ears:    Ear canals and TMs are clear.  No serous otitis seen.  Neck: Neck is supple without lymphadenopathy.  Lungs: Normal effort,   Ext: No clubbing, cyanosis, edema.  Neuro: Cranial nerves II through VIII appear grossly intact.  No lateralizing signs are seen.  Gait is normal.    Labs: Lab results from digestive health reviewed.  Annual labs are due until later this year.    Assessment & Plan:     69 y.o. male with the following -     1. 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  I told patient he most likely has benign positional vertigo and recent allergies or eustachian tube dysfunction could have aggravated it.  We talked about the Epley maneuvers and he can look those up online.  If he has continued troubles we can refer him to physical therapy.  Also " strongly encouraged him to hydrate better is that can help manage this as well.    2. History of hepatitis C  This is a chronic stable condition.  Continue follow-up through GI.    3. Essential hypertension, benign  This is a chronic problem.  Continue to monitor.  Will leave his medications on the current dose for the present time.      Return in about 4 months (around 10/18/2024) for Long.    Please note that this dictation was created using voice recognition software. I have made every reasonable attempt to correct obvious errors, but I expect that there are errors of grammar and possibly content that I did not discover before finalizing the note.

## 2024-06-18 NOTE — ASSESSMENT & PLAN NOTE
This is a chronic problem.  His systolic pressure is slightly above his normal today.  Will continue to monitor that.

## 2024-07-23 ENCOUNTER — HOSPITAL ENCOUNTER (OUTPATIENT)
Dept: RADIOLOGY | Facility: MEDICAL CENTER | Age: 69
End: 2024-07-23
Payer: MEDICARE

## 2024-07-23 DIAGNOSIS — K76.9 CHRONIC LIVER DISEASE: ICD-10-CM

## 2024-07-23 DIAGNOSIS — Z86.19 HX OF HEPATITIS C: ICD-10-CM

## 2024-07-23 PROCEDURE — 76700 US EXAM ABDOM COMPLETE: CPT

## 2024-09-09 RX ORDER — IRBESARTAN 150 MG/1
150 TABLET ORAL 2 TIMES DAILY
Qty: 200 TABLET | Refills: 2 | Status: SHIPPED | OUTPATIENT
Start: 2024-09-09

## 2024-09-09 NOTE — TELEPHONE ENCOUNTER
Received request via: Pharmacy    Was the patient seen in the last year in this department? Yes    Does the patient have an active prescription (recently filled or refills available) for medication(s) requested? No    Pharmacy Name: [facility] #[id number] - [place], [place] - [address]     Does the patient have alf Plus and need 100-day supply? (This applies to ALL medications) Yes, quantity updated to 100 days

## 2024-09-11 ENCOUNTER — HOSPITAL ENCOUNTER (OUTPATIENT)
Dept: LAB | Facility: MEDICAL CENTER | Age: 69
End: 2024-09-11
Payer: MEDICARE

## 2024-09-11 LAB
ALBUMIN SERPL BCP-MCNC: 4.7 G/DL (ref 3.2–4.9)
ALBUMIN/GLOB SERPL: 1.8 G/DL
ALP SERPL-CCNC: 112 U/L (ref 30–99)
ALT SERPL-CCNC: 34 U/L (ref 2–50)
ANION GAP SERPL CALC-SCNC: 13 MMOL/L (ref 7–16)
AST SERPL-CCNC: 22 U/L (ref 12–45)
BILIRUB SERPL-MCNC: 0.8 MG/DL (ref 0.1–1.5)
BUN SERPL-MCNC: 13 MG/DL (ref 8–22)
CALCIUM ALBUM COR SERPL-MCNC: 9.1 MG/DL (ref 8.5–10.5)
CALCIUM SERPL-MCNC: 9.7 MG/DL (ref 8.5–10.5)
CHLORIDE SERPL-SCNC: 103 MMOL/L (ref 96–112)
CO2 SERPL-SCNC: 26 MMOL/L (ref 20–33)
CREAT SERPL-MCNC: 1.09 MG/DL (ref 0.5–1.4)
EST. AVERAGE GLUCOSE BLD GHB EST-MCNC: 117 MG/DL
FERRITIN SERPL-MCNC: 121 NG/ML (ref 22–322)
GFR SERPLBLD CREATININE-BSD FMLA CKD-EPI: 74 ML/MIN/1.73 M 2
GGT SERPL-CCNC: 23 U/L (ref 7–51)
GLOBULIN SER CALC-MCNC: 2.6 G/DL (ref 1.9–3.5)
GLUCOSE SERPL-MCNC: 106 MG/DL (ref 65–99)
HBA1C MFR BLD: 5.7 % (ref 4–5.6)
HBV CORE AB SERPL QL IA: NONREACTIVE
HBV SURFACE AB SERPL IA-ACNC: <3.5 MIU/ML (ref 0–10)
HBV SURFACE AG SER QL: NORMAL
INR PPP: 1.03 (ref 0.87–1.13)
IRON SATN MFR SERPL: 34 % (ref 15–55)
IRON SERPL-MCNC: 102 UG/DL (ref 50–180)
POTASSIUM SERPL-SCNC: 4.5 MMOL/L (ref 3.6–5.5)
PROT SERPL-MCNC: 7.3 G/DL (ref 6–8.2)
PROTHROMBIN TIME: 13.6 SEC (ref 12–14.6)
SODIUM SERPL-SCNC: 142 MMOL/L (ref 135–145)
T4 FREE SERPL-MCNC: 1.24 NG/DL (ref 0.93–1.7)
TIBC SERPL-MCNC: 304 UG/DL (ref 250–450)
TSH SERPL-ACNC: 4.99 UIU/ML (ref 0.35–5.5)
UIBC SERPL-MCNC: 202 UG/DL (ref 110–370)

## 2024-09-11 PROCEDURE — 83540 ASSAY OF IRON: CPT

## 2024-09-11 PROCEDURE — 87340 HEPATITIS B SURFACE AG IA: CPT

## 2024-09-11 PROCEDURE — 84075 ASSAY ALKALINE PHOSPHATASE: CPT

## 2024-09-11 PROCEDURE — 84450 TRANSFERASE (AST) (SGOT): CPT

## 2024-09-11 PROCEDURE — 82390 ASSAY OF CERULOPLASMIN: CPT

## 2024-09-11 PROCEDURE — 86015 ACTIN ANTIBODY EACH: CPT

## 2024-09-11 PROCEDURE — 84478 ASSAY OF TRIGLYCERIDES: CPT

## 2024-09-11 PROCEDURE — 86381 MITOCHONDRIAL ANTIBODY EACH: CPT

## 2024-09-11 PROCEDURE — 84439 ASSAY OF FREE THYROXINE: CPT

## 2024-09-11 PROCEDURE — 83036 HEMOGLOBIN GLYCOSYLATED A1C: CPT

## 2024-09-11 PROCEDURE — 82977 ASSAY OF GGT: CPT

## 2024-09-11 PROCEDURE — 86706 HEP B SURFACE ANTIBODY: CPT

## 2024-09-11 PROCEDURE — 82247 BILIRUBIN TOTAL: CPT

## 2024-09-11 PROCEDURE — 82172 ASSAY OF APOLIPOPROTEIN: CPT

## 2024-09-11 PROCEDURE — 83010 ASSAY OF HAPTOGLOBIN QUANT: CPT

## 2024-09-11 PROCEDURE — 85610 PROTHROMBIN TIME: CPT

## 2024-09-11 PROCEDURE — 82947 ASSAY GLUCOSE BLOOD QUANT: CPT

## 2024-09-11 PROCEDURE — 82103 ALPHA-1-ANTITRYPSIN TOTAL: CPT

## 2024-09-11 PROCEDURE — 84080 ASSAY ALKALINE PHOSPHATASES: CPT

## 2024-09-11 PROCEDURE — 36415 COLL VENOUS BLD VENIPUNCTURE: CPT

## 2024-09-11 PROCEDURE — 83883 ASSAY NEPHELOMETRY NOT SPEC: CPT

## 2024-09-11 PROCEDURE — 82465 ASSAY BLD/SERUM CHOLESTEROL: CPT

## 2024-09-11 PROCEDURE — 86708 HEPATITIS A ANTIBODY: CPT

## 2024-09-11 PROCEDURE — 82728 ASSAY OF FERRITIN: CPT

## 2024-09-11 PROCEDURE — 84460 ALANINE AMINO (ALT) (SGPT): CPT

## 2024-09-11 PROCEDURE — 83550 IRON BINDING TEST: CPT

## 2024-09-11 PROCEDURE — 84443 ASSAY THYROID STIM HORMONE: CPT

## 2024-09-11 PROCEDURE — 86038 ANTINUCLEAR ANTIBODIES: CPT

## 2024-09-11 PROCEDURE — 86704 HEP B CORE ANTIBODY TOTAL: CPT

## 2024-09-11 PROCEDURE — 80053 COMPREHEN METABOLIC PANEL: CPT

## 2024-09-13 LAB
A1AT SERPL-MCNC: 151 MG/DL (ref 90–200)
CERULOPLASMIN SERPL-MCNC: 22 MG/DL (ref 15–30)
HAV AB SER QL IA: NEGATIVE
MITOCHONDRIA M2 IGG SER-ACNC: 3.2 UNITS (ref 0–24.9)
NUCLEAR IGG SER QL IA: NORMAL
SMA IGG SER-ACNC: 9 UNITS (ref 0–19)

## 2024-09-14 LAB
A2 MACROGLOB SERPL-MCNC: 376 MG/DL (ref 110–276)
ALP BONE SERPL-CCNC: 40 U/L (ref 0–55)
ALP ISOS SERPL HS-CCNC: 0 U/L
ALP LIVER SERPL-CCNC: 73 U/L (ref 0–94)
ALP SERPL-CCNC: 113 U/L (ref 40–120)
ALT SERPL W P-5'-P-CCNC: 36 IU/L (ref 0–55)
APO A-I SERPL-MCNC: 102 MG/DL (ref 101–178)
AST SERPL W P-5'-P-CCNC: 24 IU/L (ref 0–40)
BILIRUB SERPL-MCNC: 0.8 MG/DL (ref 0–1.2)
CHOLEST SERPL-MCNC: 95 MG/DL (ref 100–199)
FIBROSIS SCORING 550107: ABNORMAL
FIBROSIS STAGE SERPL QL: ABNORMAL
GGT SERPL-CCNC: 21 IU/L (ref 0–65)
GLUCOSE SERPL-MCNC: 102 MG/DL (ref 70–99)
HAPTOGLOB SERPL-MCNC: 69 MG/DL (ref 32–363)
LABORATORY COMMENT REPORT: ABNORMAL
LIVER FIBR SCORE SERPL CALC.FIBROSURE: 0.83 (ref 0–0.21)
LIVER STEATOSIS GRADE SERPL QL: ABNORMAL
LIVER STEATOSIS SCORE SERPL: 0.4 (ref 0–0.4)
NASH GRADE SERPL QL: ABNORMAL
NASH INTERPRETATION SERPL-IMP: ABNORMAL
NASH SCORE SERPL: 0 (ref 0–0.25)
NASH SCORING Z4789: ABNORMAL
STEATOSIS SCORING NL11718: ABNORMAL
TEST PERFORMANCE INFO SPEC: ABNORMAL
TEST PERFORMANCE INFO SPEC: ABNORMAL
TRIGL SERPL-MCNC: 152 MG/DL (ref 0–149)

## 2024-09-25 ENCOUNTER — OFFICE VISIT (OUTPATIENT)
Dept: MEDICAL GROUP | Facility: PHYSICIAN GROUP | Age: 69
End: 2024-09-25
Payer: MEDICARE

## 2024-09-25 VITALS
TEMPERATURE: 98.5 F | HEART RATE: 52 BPM | BODY MASS INDEX: 28.11 KG/M2 | SYSTOLIC BLOOD PRESSURE: 132 MMHG | WEIGHT: 179.1 LBS | OXYGEN SATURATION: 100 % | DIASTOLIC BLOOD PRESSURE: 74 MMHG | RESPIRATION RATE: 16 BRPM | HEIGHT: 67 IN

## 2024-09-25 DIAGNOSIS — E78.5 DYSLIPIDEMIA: ICD-10-CM

## 2024-09-25 PROCEDURE — 3075F SYST BP GE 130 - 139MM HG: CPT | Performed by: FAMILY MEDICINE

## 2024-09-25 PROCEDURE — 3078F DIAST BP <80 MM HG: CPT | Performed by: FAMILY MEDICINE

## 2024-09-25 PROCEDURE — 99213 OFFICE O/P EST LOW 20 MIN: CPT | Performed by: FAMILY MEDICINE

## 2024-09-25 ASSESSMENT — FIBROSIS 4 INDEX: FIB4 SCORE: 1.79

## 2024-09-25 NOTE — PROGRESS NOTES
Subjective:     CC: Here to discuss his cholesterol medications.    HPI:   Rodrigo presents today with the following medical concerns:    Dyslipidemia  This is a chronic problem.  Patient's lipids are very good on his statin and he is tolerating it well.  But he is here to discuss if he needs to continue on it or not.  He is concerned that it could cause liver issues.  His liver test been normal except his alkaline phos is slightly above normal.  He has some other risk factors for heart disease namely having high blood pressure and being a male.    History reviewed. No pertinent past medical history.    Social History     Tobacco Use    Smoking status: Former     Current packs/day: 0.00     Average packs/day: 0.5 packs/day for 41.5 years (20.7 ttl pk-yrs)     Types: Cigarettes     Start date: 1970     Quit date: 5/15/2012     Years since quittin.3    Smokeless tobacco: Never   Vaping Use    Vaping status: Never Used   Substance Use Topics    Alcohol use: Not Currently    Drug use: Never       Current Outpatient Medications Ordered in Epic   Medication Sig Dispense Refill    irbesartan (AVAPRO) 150 MG Tab TAKE 1 TABLET BY MOUTH TWICE A  Tablet 2    amLODIPine (NORVASC) 5 MG Tab TAKE 1 TABLET BY MOUTH EVERY  Tablet 3    atorvastatin (LIPITOR) 20 MG Tab Take 1 Tablet by mouth every evening. 100 Tablet 2    allopurinol (ZYLOPRIM) 100 MG Tab TAKE 1 TABLET BY MOUTH TWICE A  Tablet 3    aspirin 81 MG EC tablet ASPIRIN 81 MG TABLET      albuterol 108 (90 Base) MCG/ACT Aero Soln inhalation aerosol Inhale 1-2 Puffs every 6 hours as needed for Shortness of Breath. 1 Each 5     No current Epic-ordered facility-administered medications on file.       Allergies:  Patient has no known allergies.    Health Maintenance: Completed    ROS:  Gen: no fevers/chills, no changes in weight  Eyes: no changes in vision  ENT: no sore throat, no hearing loss, no bloody nose  Pulm: no sob, no cough  CV: no chest  "pain, no palpitations  GI: no nausea/vomiting, no diarrhea  : no dysuria  MSk: no myalgias  Skin: no rash  Neuro: no headaches, no numbness/tingling  Heme/Lymph: no easy bruising      Objective:       Exam:  /74 (BP Location: Left arm, Patient Position: Sitting, BP Cuff Size: Adult)   Pulse (!) 52   Temp 36.9 °C (98.5 °F) (Temporal)   Resp 16   Ht 1.702 m (5' 7\")   Wt 81.2 kg (179 lb 1.6 oz)   SpO2 100%   BMI 28.05 kg/m²  Body mass index is 28.05 kg/m².    Gen: Alert and oriented, No apparent distress.  Lungs: Normal effort,   Ext: No clubbing, cyanosis, edema.      Labs: Recent labs done by the gastroenterology clinic reviewed and also went over the results of his PSA and lipid panel done last year.    Assessment & Plan:     69 y.o. male with the following -     1. Dyslipidemia  This is a chronic problem.  I told patient given that he has evidence of coronary artery calcifications that he should continue on his statin since he is not having symptoms of.  He is agreeable and will continue to do so.  He does had a bunch of labs done through GI clinic.  He wants to wait and do his PSA and lipid panel next year.  Will renew his cholesterol medication when he comes due next month.      Return in about 6 months (around 3/25/2025) for Long.    Please note that this dictation was created using voice recognition software. I have made every reasonable attempt to correct obvious errors, but I expect that there are errors of grammar and possibly content that I did not discover before finalizing the note.        "

## 2024-09-25 NOTE — ASSESSMENT & PLAN NOTE
This is a chronic problem.  Patient's lipids are very good on his statin and he is tolerating it well.  But he is here to discuss if he needs to continue on it or not.  He is concerned that it could cause liver issues.  His liver test been normal except his alkaline phos is slightly above normal.  He has some other risk factors for heart disease namely having high blood pressure and being a male.

## 2024-10-14 RX ORDER — ALLOPURINOL 100 MG/1
100 TABLET ORAL 2 TIMES DAILY
Qty: 180 TABLET | Refills: 3 | Status: SHIPPED | OUTPATIENT
Start: 2024-10-14

## 2024-10-28 RX ORDER — ATORVASTATIN CALCIUM 20 MG/1
20 TABLET, FILM COATED ORAL EVERY EVENING
Qty: 100 TABLET | Refills: 3 | Status: SHIPPED | OUTPATIENT
Start: 2024-10-28

## 2025-03-25 ENCOUNTER — OFFICE VISIT (OUTPATIENT)
Dept: MEDICAL GROUP | Facility: PHYSICIAN GROUP | Age: 70
End: 2025-03-25
Payer: MEDICARE

## 2025-03-25 VITALS
SYSTOLIC BLOOD PRESSURE: 136 MMHG | HEART RATE: 60 BPM | WEIGHT: 186 LBS | RESPIRATION RATE: 16 BRPM | OXYGEN SATURATION: 98 % | HEIGHT: 68 IN | DIASTOLIC BLOOD PRESSURE: 74 MMHG | TEMPERATURE: 97.8 F | BODY MASS INDEX: 28.19 KG/M2

## 2025-03-25 DIAGNOSIS — Z00.00 ADULT GENERAL MEDICAL EXAM: ICD-10-CM

## 2025-03-25 DIAGNOSIS — Z12.5 PROSTATE CANCER SCREENING: ICD-10-CM

## 2025-03-25 DIAGNOSIS — R91.8 LUNG NODULE, MULTIPLE: ICD-10-CM

## 2025-03-25 DIAGNOSIS — I10 ESSENTIAL HYPERTENSION, BENIGN: ICD-10-CM

## 2025-03-25 DIAGNOSIS — M10.072 IDIOPATHIC GOUT, LEFT ANKLE AND FOOT: ICD-10-CM

## 2025-03-25 DIAGNOSIS — H93.13 TINNITUS OF BOTH EARS: ICD-10-CM

## 2025-03-25 DIAGNOSIS — Z87.891 HISTORY OF SMOKING: ICD-10-CM

## 2025-03-25 DIAGNOSIS — D69.6 THROMBOCYTOPENIA (HCC): ICD-10-CM

## 2025-03-25 PROCEDURE — 3078F DIAST BP <80 MM HG: CPT | Performed by: FAMILY MEDICINE

## 2025-03-25 PROCEDURE — 99214 OFFICE O/P EST MOD 30 MIN: CPT | Performed by: FAMILY MEDICINE

## 2025-03-25 PROCEDURE — 3075F SYST BP GE 130 - 139MM HG: CPT | Performed by: FAMILY MEDICINE

## 2025-03-25 ASSESSMENT — FIBROSIS 4 INDEX: FIB4 SCORE: 1.81

## 2025-03-25 ASSESSMENT — PATIENT HEALTH QUESTIONNAIRE - PHQ9: CLINICAL INTERPRETATION OF PHQ2 SCORE: 0

## 2025-03-25 NOTE — ASSESSMENT & PLAN NOTE
Here for his exam and follow-up.  Also due for annual labs those will be ordered.  Has several questions about issues that are bothering him.  He also states about 4 years ago he had x-rays and MRIs done of his shoulders.  That showed some partial tears of rotator cuff tendons and he like to get those records put into his [facility] records.  Will have him sign a record release for those.

## 2025-03-25 NOTE — LETTER
[facility]  Wayne Almazan III, M.D.  [address]  [facility] [address]  Fax: [phone]   Authorization for Release/Disclosure of   Protected Health Information   Name: JOSE LUIS HUDSON JR. : 1955 SSN: [id number]   Address: [address] [place] [address] [place]  [facility] [address] Phone:    There are no phone numbers on file.   I authorize the entity listed below to release/disclose the PHI below to:   [facility]/Wayne Almazan III, M.D. and Wayne Almazan III, M.D.   Provider or Entity Name:  (   Address   City, State, Zip   Phone: [phone]       Fax: [phone]      Reason for request: continuity of care   Information to be released:    [  ] LAST COLONOSCOPY,  including any PATH REPORT and follow-up  [  ] LAST FIT/COLOGUARD RESULT [  ] LAST DEXA  [  ] LAST MAMMOGRAM  [  ] LAST PAP  [  ] LAST LABS [  ] RETINA EXAM REPORT  [  ] IMMUNIZATION RECORDS  [ XXX ] Release all info      [  ] Check here and initial the line next to each item to release ALL health information INCLUDING  _____ Care and treatment for drug and / or alcohol abuse  _____ HIV testing, infection status, or AIDS  _____ Genetic Testing    DATES OF SERVICE OR TIME PERIOD TO BE DISCLOSED: _____________  I understand and acknowledge that:  * This Authorization may be revoked at any time by you in writing, except if your health information has already been used or disclosed.  * Your health information that will be used or disclosed as a result of you signing this authorization could be re-disclosed by the recipient. If this occurs, your re-disclosed health information may no longer be protected by State or Federal laws.  * You may refuse to sign this Authorization. Your refusal will not affect your ability to obtain treatment.  * This Authorization becomes effective upon signing and will  on (date) __________.      If no date is indicated, this Authorization will  one (1) year from the signature date.    Name: Jose Luis  Ken Nunez Jr.  Signature: Date:   3/25/2025     PLEASE FAX REQUESTED RECORDS BACK TO: [phone]

## 2025-03-25 NOTE — PROGRESS NOTES
Subjective:     CC: Here for several issues.    HPI:   Rodrigo presents today with the following medical concerns:    Adult general medical exam  Here for his exam and follow-up.  Also due for annual labs those will be ordered.  Has several questions about issues that are bothering him.  He also states about 4 years ago he had x-rays and MRIs done of his shoulders.  That showed some partial tears of rotator cuff tendons and he like to get those records put into his [facility] records.  Will have him sign a record release for those.    Essential hypertension, benign  This is a chronic stable condition.  Blood pressure well-controlled on current medications.    Idiopathic gout, left ankle and foot  This is a chronic stable condition.  Last uric acid was good at 4.1.  Continue on his allopurinol.    Lung nodule, multiple  This is a chronic problem.  Referral be made back to the lung cancer screening program.    Thrombocytopenia (HCC)  This is a chronic stable condition.  Continue to follow.    Tinnitus of both ears  This is a new problem.  Patient states that he has noted tinnitus particularly when he wakes up in the middle of the night.  Does not bother him quite so much during the day.  Declines referral for testing.    History reviewed. No pertinent past medical history.    Social History     Tobacco Use    Smoking status: Former     Current packs/day: 0.00     Average packs/day: 0.5 packs/day for 41.5 years (20.7 ttl pk-yrs)     Types: Cigarettes     Start date: 1970     Quit date: 5/15/2012     Years since quittin.8    Smokeless tobacco: Never   Vaping Use    Vaping status: Never Used   Substance Use Topics    Alcohol use: Not Currently    Drug use: Never       Current Outpatient Medications Ordered in Epic   Medication Sig Dispense Refill    Glucosamine-Chondroit-Biofl-Mn (GLUCOSAMINE CHONDROIT,BIOFLAV, PO) Take  by mouth.      atorvastatin (LIPITOR) 20 MG Tab TAKE 1 TABLET BY MOUTH EVERY DAY IN THE  "EVENING 100 Tablet 3    allopurinol (ZYLOPRIM) 100 MG Tab TAKE 1 TABLET BY MOUTH TWICE A  Tablet 3    irbesartan (AVAPRO) 150 MG Tab TAKE 1 TABLET BY MOUTH TWICE A  Tablet 2    amLODIPine (NORVASC) 5 MG Tab TAKE 1 TABLET BY MOUTH EVERY  Tablet 3    albuterol 108 (90 Base) MCG/ACT Aero Soln inhalation aerosol Inhale 1-2 Puffs every 6 hours as needed for Shortness of Breath. 1 Each 5    aspirin 81 MG EC tablet ASPIRIN 81 MG TABLET       No current Epic-ordered facility-administered medications on file.       Allergies:  Patient has no known allergies.    Health Maintenance: Completed    ROS:  Gen: no fevers/chills, no changes in weight  Eyes: no changes in vision  ENT: no sore throat, no hearing loss, no bloody nose  Pulm: no sob, no cough  CV: no chest pain, no palpitations  GI: no nausea/vomiting, no diarrhea  : no dysuria  MSk: no myalgias  Skin: no rash  Neuro: no headaches, no numbness/tingling  Heme/Lymph: no easy bruising      Objective:       Exam:  /74 (BP Location: Left arm, Patient Position: Sitting, BP Cuff Size: Adult)   Pulse 60   Temp 36.6 °C (97.8 °F) (Temporal)   Resp 16   Ht 1.727 m (5' 8\")   Wt 84.4 kg (186 lb)   SpO2 98%   BMI 28.28 kg/m²  Body mass index is 28.28 kg/m².    Gen: Alert and oriented, No apparent distress.  Eyes:   Extraocular motions intact.  No scleral dressing.  Ears:    There is a very small amount of wax on the right ear canal.  TM is normal.  Left ear canal and TM are normal.  Neck: Neck is supple without lymphadenopathy.  Thyroid exam is normal.  Lungs: Normal effort, CTA bilaterally, no wheezes, rhonchi, or rales  CV: Regular rate and rhythm. No murmurs, rubs, or gallops.  No carotid bruits heard.  Abdomen: Soft, nontender, no again megaly or masses.  Ext: No clubbing, cyanosis, edema.  Neuro: Cranial nerves II through VIII are grossly intact.  No lateralized signs are seen.  Gait is normal.    Labs: Ordered    Assessment & Plan:     70 y.o. " male with the following -     1. Adult general medical exam  Patient's exam was performed.  General health issues addressed.  Baseline labs ordered.  - Comp Metabolic Panel; Future  - Lipid Profile; Future  - URINALYSIS,CULTURE IF INDICATED; Future  - ESTIMATED GFR; Future  - CBC WITH DIFFERENTIAL; Future    2. Essential hypertension, benign  This is a chronic stable condition.  Continue current medications.  - Comp Metabolic Panel; Future  - URINALYSIS,CULTURE IF INDICATED; Future  - ESTIMATED GFR; Future  - CBC WITH DIFFERENTIAL; Future    3. Idiopathic gout, left ankle and foot  This is a chronic problem.  Uric acid ordered.  Continue on allopurinol.  - URIC ACID; Future    4. Thrombocytopenia (HCC)  This is a chronic problem.  Lab ordered.  - CBC WITH DIFFERENTIAL; Future    5. Prostate cancer screening  This is a screening issue.  Lab ordered.  - PROSTATE SPECIFIC AG SCREENING; Future    6. Tinnitus of both ears  This is a chronic problem.  Discussed referral to audiology and he declines for the present time.    7. History of smoking  This is a chronic problem.  Referral made.  - REFERRAL TO LUNG CANCER SCREENING PROGRAM    8. Lung nodule, multiple  This is a chronic problem.  Referral made.  - REFERRAL TO LUNG CANCER SCREENING PROGRAM      Return in about 6 months (around 9/25/2025) for Long.    Please note that this dictation was created using voice recognition software. I have made every reasonable attempt to correct obvious errors, but I expect that there are errors of grammar and possibly content that I did not discover before finalizing the note.

## 2025-03-25 NOTE — ASSESSMENT & PLAN NOTE
This is a chronic stable condition.  Last uric acid was good at 4.1.  Continue on his allopurinol.

## 2025-03-25 NOTE — ASSESSMENT & PLAN NOTE
This is a new problem.  Patient states that he has noted tinnitus particularly when he wakes up in the middle of the night.  Does not bother him quite so much during the day.  Declines referral for testing.

## 2025-05-12 ENCOUNTER — HOSPITAL ENCOUNTER (OUTPATIENT)
Dept: LAB | Facility: MEDICAL CENTER | Age: 70
End: 2025-05-12
Attending: FAMILY MEDICINE
Payer: MEDICARE

## 2025-05-12 DIAGNOSIS — Z12.5 PROSTATE CANCER SCREENING: ICD-10-CM

## 2025-05-12 DIAGNOSIS — I10 ESSENTIAL HYPERTENSION, BENIGN: ICD-10-CM

## 2025-05-12 DIAGNOSIS — M10.072 IDIOPATHIC GOUT, LEFT ANKLE AND FOOT: ICD-10-CM

## 2025-05-12 DIAGNOSIS — D69.6 THROMBOCYTOPENIA (HCC): ICD-10-CM

## 2025-05-12 DIAGNOSIS — Z00.00 ADULT GENERAL MEDICAL EXAM: ICD-10-CM

## 2025-05-12 LAB
ALBUMIN SERPL BCP-MCNC: 4.4 G/DL (ref 3.2–4.9)
ALBUMIN/GLOB SERPL: 1.8 G/DL
ALP SERPL-CCNC: 114 U/L (ref 30–99)
ALT SERPL-CCNC: 28 U/L (ref 2–50)
ANION GAP SERPL CALC-SCNC: 10 MMOL/L (ref 7–16)
APPEARANCE UR: CLEAR
AST SERPL-CCNC: 23 U/L (ref 12–45)
BACTERIA #/AREA URNS HPF: NORMAL /HPF
BASOPHILS # BLD AUTO: 0.6 % (ref 0–1.8)
BASOPHILS # BLD: 0.03 K/UL (ref 0–0.12)
BILIRUB SERPL-MCNC: 0.9 MG/DL (ref 0.1–1.5)
BILIRUB UR QL STRIP.AUTO: NEGATIVE
BUN SERPL-MCNC: 15 MG/DL (ref 8–22)
CALCIUM ALBUM COR SERPL-MCNC: 9.4 MG/DL (ref 8.5–10.5)
CALCIUM SERPL-MCNC: 9.7 MG/DL (ref 8.5–10.5)
CASTS URNS QL MICRO: NORMAL /LPF (ref 0–2)
CHLORIDE SERPL-SCNC: 106 MMOL/L (ref 96–112)
CHOLEST SERPL-MCNC: 99 MG/DL (ref 100–199)
CO2 SERPL-SCNC: 24 MMOL/L (ref 20–33)
COLOR UR: YELLOW
CREAT SERPL-MCNC: 1.09 MG/DL (ref 0.5–1.4)
EOSINOPHIL # BLD AUTO: 0.21 K/UL (ref 0–0.51)
EOSINOPHIL NFR BLD: 4.1 % (ref 0–6.9)
EPITHELIAL CELLS 1715: NORMAL /HPF (ref 0–5)
ERYTHROCYTE [DISTWIDTH] IN BLOOD BY AUTOMATED COUNT: 37.9 FL (ref 35.9–50)
FASTING STATUS PATIENT QL REPORTED: NORMAL
GFR SERPLBLD CREATININE-BSD FMLA CKD-EPI: 74 ML/MIN/1.73 M 2
GLOBULIN SER CALC-MCNC: 2.5 G/DL (ref 1.9–3.5)
GLUCOSE SERPL-MCNC: 96 MG/DL (ref 65–99)
GLUCOSE UR STRIP.AUTO-MCNC: NEGATIVE MG/DL
HCT VFR BLD AUTO: 41.7 % (ref 42–52)
HDLC SERPL-MCNC: 28 MG/DL
HGB BLD-MCNC: 14.3 G/DL (ref 14–18)
IMM GRANULOCYTES # BLD AUTO: 0.02 K/UL (ref 0–0.11)
IMM GRANULOCYTES NFR BLD AUTO: 0.4 % (ref 0–0.9)
KETONES UR STRIP.AUTO-MCNC: NEGATIVE MG/DL
LDLC SERPL CALC-MCNC: 41 MG/DL
LEUKOCYTE ESTERASE UR QL STRIP.AUTO: ABNORMAL
LYMPHOCYTES # BLD AUTO: 0.92 K/UL (ref 1–4.8)
LYMPHOCYTES NFR BLD: 18.1 % (ref 22–41)
MCH RBC QN AUTO: 29.8 PG (ref 27–33)
MCHC RBC AUTO-ENTMCNC: 34.3 G/DL (ref 32.3–36.5)
MCV RBC AUTO: 86.9 FL (ref 81.4–97.8)
MICRO URNS: ABNORMAL
MONOCYTES # BLD AUTO: 0.71 K/UL (ref 0–0.85)
MONOCYTES NFR BLD AUTO: 14 % (ref 0–13.4)
NEUTROPHILS # BLD AUTO: 3.18 K/UL (ref 1.82–7.42)
NEUTROPHILS NFR BLD: 62.8 % (ref 44–72)
NITRITE UR QL STRIP.AUTO: NEGATIVE
NRBC # BLD AUTO: 0 K/UL
NRBC BLD-RTO: 0 /100 WBC (ref 0–0.2)
PH UR STRIP.AUTO: 5.5 [PH] (ref 5–8)
PLATELET # BLD AUTO: 155 K/UL (ref 164–446)
PMV BLD AUTO: 10 FL (ref 9–12.9)
POTASSIUM SERPL-SCNC: 4.2 MMOL/L (ref 3.6–5.5)
PROT SERPL-MCNC: 6.9 G/DL (ref 6–8.2)
PROT UR QL STRIP: NEGATIVE MG/DL
PSA SERPL DL<=0.01 NG/ML-MCNC: 2.79 NG/ML (ref 0–4)
RBC # BLD AUTO: 4.8 M/UL (ref 4.7–6.1)
RBC # URNS HPF: NORMAL /HPF (ref 0–2)
RBC UR QL AUTO: NEGATIVE
SODIUM SERPL-SCNC: 140 MMOL/L (ref 135–145)
SP GR UR STRIP.AUTO: 1.02
TRIGL SERPL-MCNC: 148 MG/DL (ref 0–149)
URATE SERPL-MCNC: 3.9 MG/DL (ref 2.5–8.3)
UROBILINOGEN UR STRIP.AUTO-MCNC: 0.2 EU/DL
WBC # BLD AUTO: 5.1 K/UL (ref 4.8–10.8)
WBC #/AREA URNS HPF: NORMAL /HPF

## 2025-05-12 PROCEDURE — 80061 LIPID PANEL: CPT

## 2025-05-12 PROCEDURE — 84153 ASSAY OF PSA TOTAL: CPT

## 2025-05-12 PROCEDURE — 80053 COMPREHEN METABOLIC PANEL: CPT

## 2025-05-12 PROCEDURE — 81001 URINALYSIS AUTO W/SCOPE: CPT

## 2025-05-12 PROCEDURE — 36415 COLL VENOUS BLD VENIPUNCTURE: CPT

## 2025-05-12 PROCEDURE — 85025 COMPLETE CBC W/AUTO DIFF WBC: CPT

## 2025-05-12 PROCEDURE — 84550 ASSAY OF BLOOD/URIC ACID: CPT

## 2025-05-13 ENCOUNTER — RESULTS FOLLOW-UP (OUTPATIENT)
Dept: MEDICAL GROUP | Facility: PHYSICIAN GROUP | Age: 70
End: 2025-05-13

## 2025-05-14 NOTE — RESULT ENCOUNTER NOTE
Called: [phone]     1st attempt to contact patient    Did not answer, left VM to call back at [phone] for more information

## 2025-05-14 NOTE — PROGRESS NOTES
"Subjective     Rodrigo Nunez Jr. is a 70 y.o. male who presents with Lung Cancer Screening Program Prescreen            HPI  Patient seen today for initial lung cancer screening visit. Patient referred by Dr. Wayne Almazan III.     The patient meets eligibility criteria including age, smoking history (20+ pack years), if former smoker, quit in the last 15 years, and absence of signs or symptoms of lung cancer.    - Age - 70  - Smoking history - Patient has smoked for 41.5 years at an average of 0.5 ppd = 20.7 pack year smoking history.  - Current smoking status - former smoker, quit smoking in 2012  - No symptoms of lung cancer and no previous history of lung cancer     Allergies[1]    Medications Ordered Prior to Encounter[2]    Review of Systems   Constitutional:  Negative for chills, fever and weight loss.   Respiratory:          He had a cough last week but it is much better today.  Energy has now improved.  He initially had sputum \"lung oysters\" which have now improved.  No hemoptysis.  He initially had wheezing but this has improved.  No SOB.   Cardiovascular:  Negative for chest pain and palpitations.              Objective     /60 (BP Location: Right arm, Patient Position: Sitting, BP Cuff Size: Adult)   Pulse (!) 53   Resp 17   Ht 1.715 m (5' 7.5\")   Wt 83.9 kg (185 lb)   SpO2 97%   BMI 28.55 kg/m²      Physical Exam  Constitutional:       Appearance: Normal appearance.   Cardiovascular:      Rate and Rhythm: Normal rate and regular rhythm.   Pulmonary:      Effort: Pulmonary effort is normal.      Breath sounds: Normal breath sounds.   Musculoskeletal:         General: No swelling.   Neurological:      Mental Status: He is alert.                 Assessment & Plan  Personal history of tobacco use, presenting hazards to health    Orders:    CT-LUNG CANCER-SCREENING; Future       We conducted a shared decision-making process using a decision aid. We reviewed benefits and harms of " screening, including false positives and potential need for additional diagnostic testing, the possibility of over diagnosis, and total radiation exposure.    We discussed the importance of adhering to annual LDCT screening. We also discussed the impact of comorbities on the patient's the ability or willingness to undergo diagnostic procedure(s) and treatment.    Counseling on the importance of maintaining cigarette smoking abstinence if former smoker; or the importance of smoking cessation if current smoker and, if appropriate, furnishing of information about tobacco cessation interventions.    Based on our discussion, we have decided to begin annual lung cancer screening starting now.  He will get his first CT after 6/23/25 due to his recent cough which is now improved.    No follow-up needed                   [1] No Known Allergies  [2]   Current Outpatient Medications on File Prior to Visit   Medication Sig Dispense Refill    Glucosamine-Chondroit-Biofl-Mn (GLUCOSAMINE CHONDROIT,BIOFLAV, PO) Take  by mouth.      atorvastatin (LIPITOR) 20 MG Tab TAKE 1 TABLET BY MOUTH EVERY DAY IN THE EVENING 100 Tablet 3    allopurinol (ZYLOPRIM) 100 MG Tab TAKE 1 TABLET BY MOUTH TWICE A  Tablet 3    irbesartan (AVAPRO) 150 MG Tab TAKE 1 TABLET BY MOUTH TWICE A  Tablet 2    amLODIPine (NORVASC) 5 MG Tab TAKE 1 TABLET BY MOUTH EVERY  Tablet 3    albuterol 108 (90 Base) MCG/ACT Aero Soln inhalation aerosol Inhale 1-2 Puffs every 6 hours as needed for Shortness of Breath. 1 Each 5    aspirin 81 MG EC tablet ASPIRIN 81 MG TABLET       No current facility-administered medications on file prior to visit.

## 2025-05-19 ENCOUNTER — OFFICE VISIT (OUTPATIENT)
Dept: SLEEP MEDICINE | Facility: MEDICAL CENTER | Age: 70
End: 2025-05-19
Attending: FAMILY MEDICINE
Payer: MEDICARE

## 2025-05-19 VITALS
SYSTOLIC BLOOD PRESSURE: 118 MMHG | BODY MASS INDEX: 28.04 KG/M2 | RESPIRATION RATE: 17 BRPM | OXYGEN SATURATION: 97 % | HEART RATE: 53 BPM | WEIGHT: 185 LBS | DIASTOLIC BLOOD PRESSURE: 60 MMHG | HEIGHT: 68 IN

## 2025-05-19 DIAGNOSIS — Z87.891 PERSONAL HISTORY OF TOBACCO USE, PRESENTING HAZARDS TO HEALTH: Primary | ICD-10-CM

## 2025-05-19 PROCEDURE — G0296 VISIT TO DETERM LDCT ELIG: HCPCS | Performed by: FAMILY MEDICINE

## 2025-05-19 ASSESSMENT — ENCOUNTER SYMPTOMS
FEVER: 0
PALPITATIONS: 0
CHILLS: 0
WEIGHT LOSS: 0

## 2025-05-19 ASSESSMENT — FIBROSIS 4 INDEX: FIB4 SCORE: 1.91

## 2025-05-19 NOTE — Clinical Note
Thank you for referring Rodrigo to the Lung Cancer Screening program.  I enrolled him today. I will update you re: abnormal findings. -Dr. Wilda Smith

## 2025-06-09 ENCOUNTER — HOSPITAL ENCOUNTER (OUTPATIENT)
Dept: RADIOLOGY | Facility: MEDICAL CENTER | Age: 70
End: 2025-06-09
Payer: MEDICARE

## 2025-06-09 DIAGNOSIS — K82.4 GALL BLADDER POLYP: ICD-10-CM

## 2025-06-09 PROCEDURE — 76700 US EXAM ABDOM COMPLETE: CPT

## 2025-06-30 RX ORDER — IRBESARTAN 150 MG/1
150 TABLET ORAL 2 TIMES DAILY
Qty: 200 TABLET | Refills: 2 | Status: SHIPPED | OUTPATIENT
Start: 2025-06-30

## 2025-06-30 NOTE — TELEPHONE ENCOUNTER
Received request via: Pharmacy    Was the patient seen in the last year in this department? Yes    Does the patient have an active prescription (recently filled or refills available) for medication(s) requested? No    Pharmacy Name: [facility]/pharmacy #[id number] - [place], [place] - [address]     Does the patient have [facility]PC Plus and need 100-day supply? (This applies to ALL medications) Yes, quantity updated to 100 days

## 2025-07-07 RX ORDER — AMLODIPINE BESYLATE 5 MG/1
5 TABLET ORAL DAILY
Qty: 100 TABLET | Refills: 2 | Status: SHIPPED | OUTPATIENT
Start: 2025-07-07

## 2025-08-04 ENCOUNTER — HOSPITAL ENCOUNTER (OUTPATIENT)
Dept: RADIOLOGY | Facility: MEDICAL CENTER | Age: 70
End: 2025-08-04
Attending: FAMILY MEDICINE
Payer: MEDICARE

## 2025-08-04 DIAGNOSIS — Z87.891 PERSONAL HISTORY OF TOBACCO USE, PRESENTING HAZARDS TO HEALTH: ICD-10-CM

## 2025-08-04 PROCEDURE — 71271 CT THORAX LUNG CANCER SCR C-: CPT

## 2025-08-12 ENCOUNTER — OFFICE VISIT (OUTPATIENT)
Dept: MEDICAL GROUP | Facility: PHYSICIAN GROUP | Age: 70
End: 2025-08-12
Payer: MEDICARE

## 2025-08-12 VITALS
WEIGHT: 184 LBS | RESPIRATION RATE: 16 BRPM | OXYGEN SATURATION: 97 % | SYSTOLIC BLOOD PRESSURE: 124 MMHG | BODY MASS INDEX: 27.89 KG/M2 | TEMPERATURE: 98.2 F | DIASTOLIC BLOOD PRESSURE: 64 MMHG | HEIGHT: 68 IN | HEART RATE: 58 BPM

## 2025-08-12 DIAGNOSIS — J06.9 VIRAL UPPER RESPIRATORY TRACT INFECTION: ICD-10-CM

## 2025-08-12 DIAGNOSIS — M10.072 IDIOPATHIC GOUT, LEFT ANKLE AND FOOT: ICD-10-CM

## 2025-08-12 DIAGNOSIS — F41.9 ANXIETY: ICD-10-CM

## 2025-08-12 DIAGNOSIS — Z86.19 HISTORY OF HEPATITIS C: ICD-10-CM

## 2025-08-12 DIAGNOSIS — I25.10 CORONARY ARTERY CALCIFICATION: Primary | ICD-10-CM

## 2025-08-12 DIAGNOSIS — I10 ESSENTIAL HYPERTENSION, BENIGN: ICD-10-CM

## 2025-08-12 PROCEDURE — 99214 OFFICE O/P EST MOD 30 MIN: CPT | Performed by: FAMILY MEDICINE

## 2025-08-12 PROCEDURE — 3074F SYST BP LT 130 MM HG: CPT | Performed by: FAMILY MEDICINE

## 2025-08-12 PROCEDURE — 3078F DIAST BP <80 MM HG: CPT | Performed by: FAMILY MEDICINE

## 2025-08-12 ASSESSMENT — FIBROSIS 4 INDEX: FIB4 SCORE: 1.91
